# Patient Record
Sex: MALE | Race: OTHER | HISPANIC OR LATINO | Employment: UNEMPLOYED | URBAN - METROPOLITAN AREA
[De-identification: names, ages, dates, MRNs, and addresses within clinical notes are randomized per-mention and may not be internally consistent; named-entity substitution may affect disease eponyms.]

---

## 2020-08-02 ENCOUNTER — ANESTHESIA EVENT (OUTPATIENT)
Dept: SURGERY | Facility: HOSPITAL | Age: 35
DRG: 352 | End: 2020-08-02

## 2020-08-02 ENCOUNTER — HOSPITAL ENCOUNTER (INPATIENT)
Facility: HOSPITAL | Age: 35
LOS: 2 days | Discharge: HOME | DRG: 352 | End: 2020-08-04
Attending: EMERGENCY MEDICINE | Admitting: SURGERY

## 2020-08-02 DIAGNOSIS — R11.2 INTRACTABLE VOMITING WITH NAUSEA, UNSPECIFIED VOMITING TYPE: ICD-10-CM

## 2020-08-02 DIAGNOSIS — K40.30 INCARCERATED INGUINAL HERNIA: Primary | ICD-10-CM

## 2020-08-02 DIAGNOSIS — R10.13 ABDOMINAL PAIN, EPIGASTRIC: ICD-10-CM

## 2020-08-02 DIAGNOSIS — K56.690 OTHER PARTIAL INTESTINAL OBSTRUCTION: ICD-10-CM

## 2020-08-02 LAB
ALBUMIN SERPL BCP-MCNC: 4.6 G/DL (ref 3.5–5.2)
ALP SERPL-CCNC: 106 U/L (ref 55–135)
ALT SERPL W/O P-5'-P-CCNC: 29 U/L (ref 10–44)
ANION GAP SERPL CALC-SCNC: 11 MMOL/L (ref 8–16)
AST SERPL-CCNC: 18 U/L (ref 10–40)
BILIRUB SERPL-MCNC: 1 MG/DL (ref 0.1–1)
BUN SERPL-MCNC: 12 MG/DL (ref 6–20)
CALCIUM SERPL-MCNC: 10.5 MG/DL (ref 8.7–10.5)
CHLORIDE SERPL-SCNC: 97 MMOL/L (ref 95–110)
CO2 SERPL-SCNC: 27 MMOL/L (ref 23–29)
CREAT SERPL-MCNC: 0.8 MG/DL (ref 0.5–1.4)
ERYTHROCYTE [DISTWIDTH] IN BLOOD BY AUTOMATED COUNT: 11.4 % (ref 11.5–14.5)
EST. GFR  (AFRICAN AMERICAN): >60 ML/MIN/1.73 M^2
EST. GFR  (NON AFRICAN AMERICAN): >60 ML/MIN/1.73 M^2
GLUCOSE SERPL-MCNC: 280 MG/DL (ref 70–110)
HCT VFR BLD AUTO: 45.3 % (ref 40–54)
HGB BLD-MCNC: 15.7 G/DL (ref 14–18)
LACTATE SERPL-SCNC: 2.5 MMOL/L (ref 0.5–2.2)
LIPASE SERPL-CCNC: 27 U/L (ref 4–60)
MCH RBC QN AUTO: 32.6 PG (ref 27–31)
MCHC RBC AUTO-ENTMCNC: 34.7 G/DL (ref 32–36)
MCV RBC AUTO: 94 FL (ref 82–98)
PLATELET # BLD AUTO: 141 K/UL (ref 150–350)
PMV BLD AUTO: 11.1 FL (ref 9.2–12.9)
POTASSIUM SERPL-SCNC: 4 MMOL/L (ref 3.5–5.1)
PROT SERPL-MCNC: 7.9 G/DL (ref 6–8.4)
RBC # BLD AUTO: 4.82 M/UL (ref 4.6–6.2)
SARS-COV-2 RDRP RESP QL NAA+PROBE: NEGATIVE
SODIUM SERPL-SCNC: 135 MMOL/L (ref 136–145)
WBC # BLD AUTO: 7.13 K/UL (ref 3.9–12.7)

## 2020-08-02 PROCEDURE — 63600175 PHARM REV CODE 636 W HCPCS: Performed by: SURGERY

## 2020-08-02 PROCEDURE — 63600175 PHARM REV CODE 636 W HCPCS: Performed by: EMERGENCY MEDICINE

## 2020-08-02 PROCEDURE — 96376 TX/PRO/DX INJ SAME DRUG ADON: CPT

## 2020-08-02 PROCEDURE — 85027 COMPLETE CBC AUTOMATED: CPT

## 2020-08-02 PROCEDURE — 83690 ASSAY OF LIPASE: CPT

## 2020-08-02 PROCEDURE — 96375 TX/PRO/DX INJ NEW DRUG ADDON: CPT

## 2020-08-02 PROCEDURE — 99255 IP/OBS CONSLTJ NEW/EST HI 80: CPT | Mod: ,,, | Performed by: SURGERY

## 2020-08-02 PROCEDURE — 99255 PR INITIAL INPATIENT CONSULT,LEVL V: ICD-10-PCS | Mod: ,,, | Performed by: SURGERY

## 2020-08-02 PROCEDURE — 96361 HYDRATE IV INFUSION ADD-ON: CPT

## 2020-08-02 PROCEDURE — 96374 THER/PROPH/DIAG INJ IV PUSH: CPT

## 2020-08-02 PROCEDURE — 94761 N-INVAS EAR/PLS OXIMETRY MLT: CPT

## 2020-08-02 PROCEDURE — 80053 COMPREHEN METABOLIC PANEL: CPT

## 2020-08-02 PROCEDURE — 83605 ASSAY OF LACTIC ACID: CPT

## 2020-08-02 PROCEDURE — U0002 COVID-19 LAB TEST NON-CDC: HCPCS

## 2020-08-02 PROCEDURE — 11000001 HC ACUTE MED/SURG PRIVATE ROOM

## 2020-08-02 PROCEDURE — S0030 INJECTION, METRONIDAZOLE: HCPCS | Performed by: SURGERY

## 2020-08-02 PROCEDURE — 43752 NASAL/OROGASTRIC W/TUBE PLMT: CPT

## 2020-08-02 PROCEDURE — 25000003 PHARM REV CODE 250: Performed by: SURGERY

## 2020-08-02 PROCEDURE — 25500020 PHARM REV CODE 255: Performed by: EMERGENCY MEDICINE

## 2020-08-02 PROCEDURE — 99285 EMERGENCY DEPT VISIT HI MDM: CPT | Mod: 25

## 2020-08-02 PROCEDURE — 25000003 PHARM REV CODE 250: Performed by: EMERGENCY MEDICINE

## 2020-08-02 RX ORDER — ONDANSETRON 2 MG/ML
4 INJECTION INTRAMUSCULAR; INTRAVENOUS EVERY 6 HOURS PRN
Status: DISCONTINUED | OUTPATIENT
Start: 2020-08-02 | End: 2020-08-04 | Stop reason: HOSPADM

## 2020-08-02 RX ORDER — MORPHINE SULFATE 4 MG/ML
4 INJECTION, SOLUTION INTRAMUSCULAR; INTRAVENOUS EVERY 4 HOURS PRN
Status: DISCONTINUED | OUTPATIENT
Start: 2020-08-02 | End: 2020-08-04 | Stop reason: HOSPADM

## 2020-08-02 RX ORDER — METRONIDAZOLE 500 MG/100ML
500 INJECTION, SOLUTION INTRAVENOUS
Status: DISCONTINUED | OUTPATIENT
Start: 2020-08-02 | End: 2020-08-04 | Stop reason: HOSPADM

## 2020-08-02 RX ORDER — ONDANSETRON 2 MG/ML
4 INJECTION INTRAMUSCULAR; INTRAVENOUS
Status: COMPLETED | OUTPATIENT
Start: 2020-08-02 | End: 2020-08-02

## 2020-08-02 RX ORDER — CIPROFLOXACIN 2 MG/ML
400 INJECTION, SOLUTION INTRAVENOUS
Status: DISCONTINUED | OUTPATIENT
Start: 2020-08-02 | End: 2020-08-04 | Stop reason: HOSPADM

## 2020-08-02 RX ORDER — SODIUM CHLORIDE 9 MG/ML
1000 INJECTION, SOLUTION INTRAVENOUS
Status: COMPLETED | OUTPATIENT
Start: 2020-08-02 | End: 2020-08-02

## 2020-08-02 RX ORDER — MORPHINE SULFATE 4 MG/ML
4 INJECTION, SOLUTION INTRAMUSCULAR; INTRAVENOUS
Status: COMPLETED | OUTPATIENT
Start: 2020-08-02 | End: 2020-08-02

## 2020-08-02 RX ORDER — SODIUM CHLORIDE, SODIUM LACTATE, POTASSIUM CHLORIDE, CALCIUM CHLORIDE 600; 310; 30; 20 MG/100ML; MG/100ML; MG/100ML; MG/100ML
INJECTION, SOLUTION INTRAVENOUS CONTINUOUS
Status: DISCONTINUED | OUTPATIENT
Start: 2020-08-02 | End: 2020-08-03

## 2020-08-02 RX ADMIN — METRONIDAZOLE 500 MG: 500 INJECTION, SOLUTION INTRAVENOUS at 08:08

## 2020-08-02 RX ADMIN — ONDANSETRON 4 MG: 2 INJECTION INTRAMUSCULAR; INTRAVENOUS at 07:08

## 2020-08-02 RX ADMIN — MORPHINE SULFATE 4 MG: 4 INJECTION INTRAVENOUS at 01:08

## 2020-08-02 RX ADMIN — ONDANSETRON 4 MG: 2 INJECTION INTRAMUSCULAR; INTRAVENOUS at 01:08

## 2020-08-02 RX ADMIN — SODIUM CHLORIDE 1000 ML: 0.9 INJECTION, SOLUTION INTRAVENOUS at 05:08

## 2020-08-02 RX ADMIN — IOHEXOL 75 ML: 350 INJECTION, SOLUTION INTRAVENOUS at 05:08

## 2020-08-02 RX ADMIN — SODIUM CHLORIDE, SODIUM LACTATE, POTASSIUM CHLORIDE, AND CALCIUM CHLORIDE: .6; .31; .03; .02 INJECTION, SOLUTION INTRAVENOUS at 08:08

## 2020-08-02 RX ADMIN — SODIUM CHLORIDE 1000 ML: 0.9 INJECTION, SOLUTION INTRAVENOUS at 04:08

## 2020-08-02 RX ADMIN — MORPHINE SULFATE 4 MG: 4 INJECTION INTRAVENOUS at 08:08

## 2020-08-02 RX ADMIN — MORPHINE SULFATE 4 MG: 4 INJECTION INTRAVENOUS at 07:08

## 2020-08-02 RX ADMIN — MORPHINE SULFATE 4 MG: 4 INJECTION INTRAVENOUS at 05:08

## 2020-08-02 RX ADMIN — METRONIDAZOLE 500 MG: 500 INJECTION, SOLUTION INTRAVENOUS at 11:08

## 2020-08-02 RX ADMIN — CIPROFLOXACIN 400 MG: 2 INJECTION, SOLUTION INTRAVENOUS at 11:08

## 2020-08-02 RX ADMIN — MORPHINE SULFATE 4 MG: 4 INJECTION INTRAVENOUS at 04:08

## 2020-08-02 RX ADMIN — ONDANSETRON HYDROCHLORIDE 4 MG: 2 SOLUTION INTRAMUSCULAR; INTRAVENOUS at 04:08

## 2020-08-02 RX ADMIN — SODIUM CHLORIDE, SODIUM LACTATE, POTASSIUM CHLORIDE, AND CALCIUM CHLORIDE: .6; .31; .03; .02 INJECTION, SOLUTION INTRAVENOUS at 11:08

## 2020-08-02 NOTE — PLAN OF CARE
Patient came into unit from ER accompanied by transport aide. With intact NGT on left nostril connected to low intermittent wall suction. Placed comfortably in bed. IV fluid and IV antibiotics started. C/o inguinal pain, PRN pain medicine given. Patient kept NPO. Mouth swab and throat given at bedside. Call light within reach. Will continue to monitor.

## 2020-08-02 NOTE — ED NOTES
Pt report received from HA Wiley. Pt is sitting awake, alert, orientedx4 with NG tube in right nostril. NG tube connected to low intermittent suction. Light brown secretions is noted to be suctioned. Pt reports improvement in generalized abd pain since NG tube insertion but states inguinal hernia pain continues. Obvious inguinal bulge noted. Pt reports last BM was yesterday at 1400. VSS. Per Dr. Andrea, place ice pack on inguinal hernia and place patient in trendelenburg position.

## 2020-08-02 NOTE — ED NOTES
Pt tolerated NG tube insertion. Brown secretions noted to be suctioned. Pt placed on low intermittent suction. Will continue to monitor.

## 2020-08-02 NOTE — ED PROVIDER NOTES
Encounter Date: 8/2/2020    SCRIBE #1 NOTE: I, Barb Chiuz, am scribing for, and in the presence of,  Noah Martinez Jr, MD. I have scribed the entire note.       History     Chief Complaint   Patient presents with    Abdominal Pain     Pt presents with c/o generalized abdominal pain that began yesterday, states his stomach feels like its going to explode. Also reports left sided inguinal hernia and vomiting      Navi Álvarez is a 34 y.o. male who  has no past medical history on file.    The patient presents to the ED due to generalized abdominal pain associated with N/V that began yesterday at 21:00.   He reports that he has had R-sided inguinal hernia for 1.5 years, but states it became swollen, hard, and painful yesterday.  He denies any fever, diarrhea, prior abdominal surgery, or any other complaints.     The history is provided by the patient. The history is limited by a language barrier. A  was used (Lola Mendiola RN).     Review of patient's allergies indicates:  No Known Allergies  No past medical history on file.  No past surgical history on file.  No family history on file.  Social History     Tobacco Use    Smoking status: Unknown If Ever Smoked    Smokeless tobacco: Current User     Types: Chew   Substance Use Topics    Alcohol use: Yes     Alcohol/week: 4.0 - 6.0 standard drinks     Types: 4 - 6 Cans of beer per week     Comment: SOMETIMES     Drug use: Not on file     Review of Systems   Constitutional: Negative for chills and fever.   HENT: Negative for sore throat.    Respiratory: Negative for cough and shortness of breath.    Cardiovascular: Negative for chest pain.   Gastrointestinal: Positive for abdominal pain, nausea and vomiting.   Genitourinary: Negative for dysuria, frequency and urgency.   Musculoskeletal: Negative for back pain.   Skin: Negative for rash and wound.   Neurological: Negative for syncope and weakness.   Hematological: Does not  bruise/bleed easily.   Psychiatric/Behavioral: Negative for agitation, behavioral problems and confusion.       Physical Exam     Initial Vitals [08/02/20 0336]   BP Pulse Resp Temp SpO2   128/80 95 16 98.9 °F (37.2 °C) 98 %      MAP       --         Physical Exam    Nursing note and vitals reviewed.  Constitutional: He appears well-developed and well-nourished. He is not diaphoretic. No distress.   HENT:   Head: Normocephalic and atraumatic.   Mouth/Throat: Oropharynx is clear and moist.   Eyes: EOM are normal. Pupils are equal, round, and reactive to light.   Neck: No tracheal deviation present.   Cardiovascular: Normal rate, regular rhythm, normal heart sounds and intact distal pulses.   Pulmonary/Chest: Breath sounds normal. No stridor. No respiratory distress.   Abdominal: Soft. He exhibits no distension and no mass. There is generalized abdominal tenderness. There is no rebound and no guarding. A hernia is present. Hernia confirmed positive in the right inguinal area.   Large R inguinal hernia that is firm and exquisitely tender.  Generalized abdominal tenderness without rigidity, rebound/guarding.   Musculoskeletal: Normal range of motion. No edema.   Neurological: He is alert and oriented to person, place, and time. No cranial nerve deficit or sensory deficit.   Skin: Skin is warm and dry. Capillary refill takes less than 2 seconds. No rash noted.   Psychiatric: He has a normal mood and affect. His behavior is normal. Thought content normal.         ED Course   Procedures  Labs Reviewed   CBC WITHOUT DIFFERENTIAL - Abnormal; Notable for the following components:       Result Value    Mean Corpuscular Hemoglobin 32.6 (*)     RDW 11.4 (*)     Platelets 141 (*)     All other components within normal limits   COMPREHENSIVE METABOLIC PANEL - Abnormal; Notable for the following components:    Sodium 135 (*)     Glucose 280 (*)     All other components within normal limits   LACTIC ACID, PLASMA - Abnormal; Notable  for the following components:    Lactate (Lactic Acid) 2.5 (*)     All other components within normal limits   LIPASE   SARS-COV-2 RNA AMPLIFICATION, QUAL            X-Rays:   Independently Interpreted Readings:   Other Readings:  Reviewed by myself, read by radiology.     Imaging Results          X-ray Abdomen for NG Tube Placement (Nursing should notify Radiology after placement) (Final result)  Result time 08/02/20 09:08:11    Final result by Gibran Harmon MD (08/02/20 09:08:11)                 Impression:      As above.      Electronically signed by: Gibran Harmon  Date:    08/02/2020  Time:    09:08             Narrative:    EXAMINATION:  XR NON-RADIOLOGIST PERFORMED NG/GASTRIC TUBE CHECK    CLINICAL HISTORY:  NGT placement;    TECHNIQUE:  Single AP view of the abdomen.    COMPARISON:  CT abdomen pelvis dated 08/02/2020 and abdominal radiograph dated 08/02/2020    FINDINGS:  Distal aspect of the esophagogastric tube projects over the left upper abdomen.  Dilated small bowel loops again demonstrated.  No evident pneumatosis.  Contrast in the bladder.                               X-Ray Abdomen Flat And Erect (Final result)  Result time 08/02/20 06:17:28    Final result by Kieran Azevedo MD (08/02/20 06:17:28)                 Impression:      Multiple small bowel air-fluid levels concerning for small bowel obstruction.      Electronically signed by: Kieran Azevedo MD  Date:    08/02/2020  Time:    06:17             Narrative:    EXAMINATION:  XR ABDOMEN FLAT AND ERECT    CLINICAL HISTORY:  Epigastric pain    TECHNIQUE:  Flat and erect AP views of the abdomen were performed.    COMPARISON:  None    FINDINGS:  There are multiple small bowel air-fluid levels concerning for small bowel obstruction as seen on corresponding abdominal CT examination.  No evidence of free intraperitoneal air.  Visualized lung bases are unremarkable.  Osseous structures appear intact.                                CT  Abdomen Pelvis With Contrast (Final result)  Result time 08/02/20 05:47:36    Final result by Kieran Azevedo MD (08/02/20 05:47:36)                 Impression:      1.  Findings compatible with small-bowel obstruction with transition point at the level of a small bowel containing right inguinal hernia.  Clinical correlation for incarceration is advised.  Surgical consultation is advised.  No free intraperitoneal air or portal venous gas.    2.  Incidentally noted 4 mm right lower lobe pulmonary micronodule.  For a solid nodule <6 mm, Fleischner Society 2017 guidelines recommend no routine follow up for a low risk patient, or follow-up with non-contrast chest CT at 12 months in a high risk patient.    This report was flagged in Epic as abnormal.      Electronically signed by: Kieran Azevedo MD  Date:    08/02/2020  Time:    05:47             Narrative:    EXAMINATION:  CT ABDOMEN PELVIS WITH CONTRAST    CLINICAL HISTORY:  Abdominal distension;Hernia, complicated;Nausea/vomiting;    TECHNIQUE:  Low dose axial images, sagittal and coronal reformations were obtained from the lung bases to the pubic symphysis following the IV administration of 75 mL of Omnipaque 350 .  Oral contrast was not given.    COMPARISON:  Abdominal radiograph series 08/02/2020    FINDINGS:  There is a 0.4 cm right lower lobe pulmonary micronodule.  There is bibasilar atelectasis.  No significant pleural fluid.  The visualized portions of the heart appear normal.    The liver is normal in size and attenuation with no focal hepatic abnormality.  The gallbladder shows no evidence of stones or pericholecystic fluid.  There is no intra-or extrahepatic biliary ductal dilatation.    The stomach, spleen, pancreas, and adrenal glands are unremarkable.    The kidneys are normal in size and location and enhance symmetrically.  There is no evidence of hydronephrosis. The urinary bladder is unremarkable. The prostate demonstrates no significant  abnormality.    The abdominal aorta is normal in course and caliber without significant atherosclerotic calcifications.    There are fluid-filled dilated loops of small bowel with multiple air-fluid levels identified throughout the abdomen and pelvis.  There is apparent focal transition point at the level of a small bowel containing right inguinal hernia.  There are decompressed loops of more distal ileum.  There is no free intraperitoneal air or portal venous gas.  The colon appears within normal limits.  There is no evidence of acute appendicitis.    Osseous structures demonstrate no acute abnormality.  Mild chronic appearing deformity is noted of the right inferior pubic ramus.  The extraperitoneal soft tissues are unremarkable.                              Medical Decision Making:   History:   Old Medical Records: I decided to obtain old medical records.  Old Records Summarized: records from clinic visits.  Differential Diagnosis:   Differential Diagnosis includes, but is not limited to:  AAA, aortic dissection, mesenteric ischemia, perforated viscous, MI/ACS, SBO/volvulus, incarcerated/strangulated hernia, intussusception, ileus, appendicitis, cholecystitis, cholangitis, diverticulitis, esophagitis, hepatitis, nephrolithiasis, pancreatitis, gastroenteritis, colitis, IBD/IBS, biliary colic, GERD, PUD, constipation, UTI/pyelonephritis,  disorder.    Clinical Tests:   Lab Tests: Reviewed and Ordered  Radiological Study: Reviewed and Ordered  ED Management:  X-ray concerning for multiple air-fluid levels. Coupled with physical exam findings, concern for incarcerated inguinal hernia with SBO.  Discussed with Dr. Andrea, General Surgery, who will evaluate and bedside and plan for admission and eventual surgical repair.    CT A/P confirmed SBO from bowel-containing R inguinal hernia.  NGT ordered and placed. Surgery to admit.    On re-evaluation, the patient's status has remained stable.  At this time, I believe the  patient should be admitted to the hospital for further evaluation and management of incarcerated hernia, SBO.  General Surgery service was contacted and the case was discussed.   The consulting physician/team agrees with plan and will admit under their service.   The patient and family were updated with test results, overall impression, and further plan of care. All questions were answered. The patient expressed understanding and agrees with the current plan.                     ED Course as of Aug 02 1858   Sun Aug 02, 2020   0544 34-year-old male with no significant past medical history presents to ER due to diffuse abdominal pain associated with nausea and vomiting.  Also endorses a right inguinal hernia that has been present for the last year.  On arrival, patient actively vomiting.  Exam with firm, swollen, exquisitely tender right inguinal hernia.  Mild generalized abdominal tenderness without peritoneal signs.  Will obtain labs, flat and erect abdomen x-ray to rule out perforation.  Will obtain CT for further characterization and anticipate discussion with General surgery for further management of incarcerated hernia.    [SS]      ED Course User Index  [SS] Naoh Martinez MD                Clinical Impression:       ICD-10-CM ICD-9-CM   1. Incarcerated inguinal hernia  K40.30 550.10   2. Abdominal pain, epigastric  R10.13 789.06   3. Other partial intestinal obstruction  K56.690 560.89   4. Intractable vomiting with nausea, unspecified vomiting type  R11.2 536.2             ED Disposition Condition    Admit                      I, Dr. Noah Martinez, personally performed the services described in this documentation. All medical record entries made by the scribe were at my direction and in my presence.  I have reviewed the chart and agree that the record reflects my personal performance and is accurate and complete.     Noah Martinez MD.               Noah Martinez MD  08/02/20 2930       Noah Martinez,  MD  08/10/20 0918

## 2020-08-02 NOTE — H&P
[facility]  INPATIENT H&P    REASON FOR CONSULT/ADMISSION:  Incarcerated right inguinal hernia    HPI: Navi Álvarez is a 34 y.o. male with known bilateral inguinal hernias.  Patient had acute onset of right groin pain and mass yesterday evening.  He notes associated nausea and vomiting times multiple episodes.  The patient has otherwise been in his baseline state of health.  He denies fevers or chills.  He states he did have a bowel movement at some point this morning.  He presented to the [place] ER where CT scan was performed that showed incarcerated right inguinal hernia as well as a left inguinal hernia.  There is no radiographic evidence to support strangulation of the right side.  Patient does not speak English in the history and physical were completed using aid of an .  White blood count is normal his lactic acid is slightly elevated.  He is nontoxic.    I have reviewed the patient's chart including prior progress notes, procedures and testing.     ROS:   Review of Systems   All other systems reviewed and are negative.      PROBLEM LIST:  There is no problem list on file for this patient.        HISTORY  No past medical history on file.    No past surgical history on file.    Social History     Tobacco Use    Smoking status: Not on file   Substance Use Topics    Alcohol use: Not on file    Drug use: Not on file       No family history on file.      MEDS:  No current facility-administered medications on file prior to encounter.      No current outpatient medications on file prior to encounter.       ALLERGIES:  Review of patient's allergies indicates:  No Known Allergies      VITALS:  Temp:  [98.9 °F (37.2 °C)] 98.9 °F (37.2 °C)  Pulse:  [] 86  Resp:  [16-20] 20  SpO2:  [95 %-99 %] 97 %  BP: (117-150)/(59-89) 126/79    I/O last 3 completed shifts:  In: 2000 [I.V.:2000]  Out: -       PHYSICAL EXAM:  Physical Exam  Constitutional:       General: He is not in acute  distress.     Appearance: He is well-developed.   HENT:      Head: Normocephalic and atraumatic.      Comments: Multiple facial scars  Eyes:      Conjunctiva/sclera: Conjunctivae normal.      Pupils: Pupils are equal, round, and reactive to light.   Neck:      Musculoskeletal: Neck supple.   Cardiovascular:      Rate and Rhythm: Normal rate and regular rhythm.   Pulmonary:      Effort: Pulmonary effort is normal.   Abdominal:      General: There is no distension.      Palpations: Abdomen is soft. There is no mass.      Tenderness: There is no abdominal tenderness. There is no guarding or rebound.      Hernia: No hernia is present.   Genitourinary:     Comments: Bilateral incarcerated inguinal hernias right greater than left.  No tenderness to palpation on the left side.  Right side is tender but there is no overlying skin erythema or crepitance.  Musculoskeletal: Normal range of motion.   Skin:     General: Skin is warm and dry.      Findings: No rash.   Neurological:      Mental Status: He is alert and oriented to person, place, and time.   Psychiatric:         Behavior: Behavior normal.           LABS:  Lab Results   Component Value Date    WBC 7.13 08/02/2020    RBC 4.82 08/02/2020    HGB 15.7 08/02/2020    HCT 45.3 08/02/2020     (L) 08/02/2020     Lab Results   Component Value Date     (H) 08/02/2020     (L) 08/02/2020    K 4.0 08/02/2020    CL 97 08/02/2020    CO2 27 08/02/2020    BUN 12 08/02/2020    CREATININE 0.8 08/02/2020    CALCIUM 10.5 08/02/2020     Lab Results   Component Value Date    ALT 29 08/02/2020    AST 18 08/02/2020    ALKPHOS 106 08/02/2020    BILITOT 1.0 08/02/2020     No results found for: MG, PHOS    STUDIES:  CT scan the abdomen pelvis as well as abdominal x-ray images and reports were personally reviewed.          ASSESSMENT & PLAN:  34 y.o. male with acutely incarcerated right inguinal hernia without strangulation as well as a chronic left inguinal hernia.  I will  admit the patient a plan for robotic bilateral inguinal hernia pair tomorrow S robot is not available over the weekend.  The patient should develop signs of strangulation, we will proceed with urgent open repair.      Pancho Andrea M.D., F.A.C.S.  [id number] and General Surgery  [facility]

## 2020-08-02 NOTE — ED NOTES
"Ice pack placed in inguinal hernia and patient placed in trendelenburg position. Pt could not tolerate trendelenburg position and started spitting up. Pt states "tube is hurting when you lay my flat." pt placed in upright position. MD informed. Will continue to monitor.   "

## 2020-08-03 ENCOUNTER — ANESTHESIA (OUTPATIENT)
Dept: SURGERY | Facility: HOSPITAL | Age: 35
DRG: 352 | End: 2020-08-03

## 2020-08-03 LAB
ANION GAP SERPL CALC-SCNC: 3 MMOL/L (ref 8–16)
BASOPHILS # BLD AUTO: 0.02 K/UL (ref 0–0.2)
BASOPHILS NFR BLD: 0.3 % (ref 0–1.9)
BUN SERPL-MCNC: 8 MG/DL (ref 6–20)
CALCIUM SERPL-MCNC: 8.4 MG/DL (ref 8.7–10.5)
CHLORIDE SERPL-SCNC: 103 MMOL/L (ref 95–110)
CO2 SERPL-SCNC: 30 MMOL/L (ref 23–29)
CREAT SERPL-MCNC: 0.7 MG/DL (ref 0.5–1.4)
DIFFERENTIAL METHOD: ABNORMAL
EOSINOPHIL # BLD AUTO: 0.1 K/UL (ref 0–0.5)
EOSINOPHIL NFR BLD: 1.2 % (ref 0–8)
ERYTHROCYTE [DISTWIDTH] IN BLOOD BY AUTOMATED COUNT: 11.8 % (ref 11.5–14.5)
EST. GFR  (AFRICAN AMERICAN): >60 ML/MIN/1.73 M^2
EST. GFR  (NON AFRICAN AMERICAN): >60 ML/MIN/1.73 M^2
GLUCOSE SERPL-MCNC: 158 MG/DL (ref 70–110)
HCT VFR BLD AUTO: 41.4 % (ref 40–54)
HGB BLD-MCNC: 14 G/DL (ref 14–18)
IMM GRANULOCYTES # BLD AUTO: 0.01 K/UL (ref 0–0.04)
IMM GRANULOCYTES NFR BLD AUTO: 0.2 % (ref 0–0.5)
LYMPHOCYTES # BLD AUTO: 0.9 K/UL (ref 1–4.8)
LYMPHOCYTES NFR BLD: 16 % (ref 18–48)
MCH RBC QN AUTO: 33 PG (ref 27–31)
MCHC RBC AUTO-ENTMCNC: 33.8 G/DL (ref 32–36)
MCV RBC AUTO: 98 FL (ref 82–98)
MONOCYTES # BLD AUTO: 0.3 K/UL (ref 0.3–1)
MONOCYTES NFR BLD: 5.8 % (ref 4–15)
NEUTROPHILS # BLD AUTO: 4.5 K/UL (ref 1.8–7.7)
NEUTROPHILS NFR BLD: 76.5 % (ref 38–73)
NRBC BLD-RTO: 0 /100 WBC
PLATELET # BLD AUTO: 146 K/UL (ref 150–350)
PMV BLD AUTO: 10.2 FL (ref 9.2–12.9)
POTASSIUM SERPL-SCNC: 3.7 MMOL/L (ref 3.5–5.1)
RBC # BLD AUTO: 4.24 M/UL (ref 4.6–6.2)
SODIUM SERPL-SCNC: 136 MMOL/L (ref 136–145)
WBC # BLD AUTO: 5.83 K/UL (ref 3.9–12.7)

## 2020-08-03 PROCEDURE — C1781 MESH (IMPLANTABLE): HCPCS | Performed by: SURGERY

## 2020-08-03 PROCEDURE — 25000003 PHARM REV CODE 250: Performed by: STUDENT IN AN ORGANIZED HEALTH CARE EDUCATION/TRAINING PROGRAM

## 2020-08-03 PROCEDURE — 36415 COLL VENOUS BLD VENIPUNCTURE: CPT

## 2020-08-03 PROCEDURE — 11000001 HC ACUTE MED/SURG PRIVATE ROOM

## 2020-08-03 PROCEDURE — 36000710: Performed by: SURGERY

## 2020-08-03 PROCEDURE — 63600175 PHARM REV CODE 636 W HCPCS: Performed by: NURSE ANESTHETIST, CERTIFIED REGISTERED

## 2020-08-03 PROCEDURE — 63600175 PHARM REV CODE 636 W HCPCS: Performed by: SURGERY

## 2020-08-03 PROCEDURE — 71000033 HC RECOVERY, INTIAL HOUR: Performed by: SURGERY

## 2020-08-03 PROCEDURE — 80048 BASIC METABOLIC PNL TOTAL CA: CPT

## 2020-08-03 PROCEDURE — 25000003 PHARM REV CODE 250: Performed by: SURGERY

## 2020-08-03 PROCEDURE — 63600175 PHARM REV CODE 636 W HCPCS: Performed by: ANESTHESIOLOGY

## 2020-08-03 PROCEDURE — 49650 PR LAP,INGUINAL HERNIA REPR,INITIAL: ICD-10-PCS | Mod: 50,,, | Performed by: SURGERY

## 2020-08-03 PROCEDURE — 88302 TISSUE EXAM BY PATHOLOGIST: CPT | Mod: 26,,, | Performed by: PATHOLOGY

## 2020-08-03 PROCEDURE — 27201423 OPTIME MED/SURG SUP & DEVICES STERILE SUPPLY: Performed by: SURGERY

## 2020-08-03 PROCEDURE — 25000003 PHARM REV CODE 250: Performed by: NURSE ANESTHETIST, CERTIFIED REGISTERED

## 2020-08-03 PROCEDURE — 37000009 HC ANESTHESIA EA ADD 15 MINS: Performed by: SURGERY

## 2020-08-03 PROCEDURE — S0030 INJECTION, METRONIDAZOLE: HCPCS | Performed by: SURGERY

## 2020-08-03 PROCEDURE — 94761 N-INVAS EAR/PLS OXIMETRY MLT: CPT

## 2020-08-03 PROCEDURE — S0030 INJECTION, METRONIDAZOLE: HCPCS | Performed by: STUDENT IN AN ORGANIZED HEALTH CARE EDUCATION/TRAINING PROGRAM

## 2020-08-03 PROCEDURE — 37000008 HC ANESTHESIA 1ST 15 MINUTES: Performed by: SURGERY

## 2020-08-03 PROCEDURE — 36000711: Performed by: SURGERY

## 2020-08-03 PROCEDURE — 85025 COMPLETE CBC W/AUTO DIFF WBC: CPT

## 2020-08-03 PROCEDURE — 49650 LAP ING HERNIA REPAIR INIT: CPT | Mod: 50,,, | Performed by: SURGERY

## 2020-08-03 PROCEDURE — 71000039 HC RECOVERY, EACH ADD'L HOUR: Performed by: SURGERY

## 2020-08-03 PROCEDURE — 88302 PR  SURG PATH,LEVEL II: ICD-10-PCS | Mod: 26,,, | Performed by: PATHOLOGY

## 2020-08-03 PROCEDURE — 88302 TISSUE EXAM BY PATHOLOGIST: CPT | Performed by: PATHOLOGY

## 2020-08-03 DEVICE — MESH PROGRIP LAP 10X15CM LEFT: Type: IMPLANTABLE DEVICE | Site: INGUINAL | Status: FUNCTIONAL

## 2020-08-03 DEVICE — MESH PROGRIP LAP 10X15CM RIGHT: Type: IMPLANTABLE DEVICE | Site: INGUINAL | Status: FUNCTIONAL

## 2020-08-03 RX ORDER — PROPOFOL 10 MG/ML
VIAL (ML) INTRAVENOUS
Status: DISCONTINUED | OUTPATIENT
Start: 2020-08-03 | End: 2020-08-03

## 2020-08-03 RX ORDER — OXYCODONE HYDROCHLORIDE 5 MG/1
5 TABLET ORAL EVERY 4 HOURS PRN
Status: DISCONTINUED | OUTPATIENT
Start: 2020-08-03 | End: 2020-08-04 | Stop reason: HOSPADM

## 2020-08-03 RX ORDER — MIDAZOLAM HYDROCHLORIDE 1 MG/ML
INJECTION, SOLUTION INTRAMUSCULAR; INTRAVENOUS
Status: DISCONTINUED | OUTPATIENT
Start: 2020-08-03 | End: 2020-08-03

## 2020-08-03 RX ORDER — ROCURONIUM BROMIDE 10 MG/ML
INJECTION, SOLUTION INTRAVENOUS
Status: DISCONTINUED | OUTPATIENT
Start: 2020-08-03 | End: 2020-08-03

## 2020-08-03 RX ORDER — FENTANYL CITRATE 50 UG/ML
INJECTION, SOLUTION INTRAMUSCULAR; INTRAVENOUS
Status: DISCONTINUED | OUTPATIENT
Start: 2020-08-03 | End: 2020-08-03

## 2020-08-03 RX ORDER — SODIUM CHLORIDE 0.9 % (FLUSH) 0.9 %
3 SYRINGE (ML) INJECTION
Status: DISCONTINUED | OUTPATIENT
Start: 2020-08-03 | End: 2020-08-03

## 2020-08-03 RX ORDER — TALC
6 POWDER (GRAM) TOPICAL NIGHTLY PRN
Status: DISCONTINUED | OUTPATIENT
Start: 2020-08-03 | End: 2020-08-04 | Stop reason: HOSPADM

## 2020-08-03 RX ORDER — ACETAMINOPHEN 325 MG/1
650 TABLET ORAL EVERY 8 HOURS PRN
Status: DISCONTINUED | OUTPATIENT
Start: 2020-08-03 | End: 2020-08-04 | Stop reason: HOSPADM

## 2020-08-03 RX ORDER — VECURONIUM BROMIDE FOR INJECTION 1 MG/ML
INJECTION, POWDER, LYOPHILIZED, FOR SOLUTION INTRAVENOUS
Status: DISCONTINUED | OUTPATIENT
Start: 2020-08-03 | End: 2020-08-03

## 2020-08-03 RX ORDER — PHENYLEPHRINE HYDROCHLORIDE 10 MG/ML
INJECTION INTRAVENOUS
Status: DISCONTINUED | OUTPATIENT
Start: 2020-08-03 | End: 2020-08-03

## 2020-08-03 RX ORDER — GLYCOPYRROLATE 0.2 MG/ML
INJECTION INTRAMUSCULAR; INTRAVENOUS
Status: DISCONTINUED | OUTPATIENT
Start: 2020-08-03 | End: 2020-08-03

## 2020-08-03 RX ORDER — SUCCINYLCHOLINE CHLORIDE 20 MG/ML
INJECTION INTRAMUSCULAR; INTRAVENOUS
Status: DISCONTINUED | OUTPATIENT
Start: 2020-08-03 | End: 2020-08-03

## 2020-08-03 RX ORDER — LIDOCAINE HCL/PF 100 MG/5ML
SYRINGE (ML) INTRAVENOUS
Status: DISCONTINUED | OUTPATIENT
Start: 2020-08-03 | End: 2020-08-03

## 2020-08-03 RX ORDER — SODIUM CHLORIDE 0.9 % (FLUSH) 0.9 %
10 SYRINGE (ML) INJECTION
Status: DISCONTINUED | OUTPATIENT
Start: 2020-08-03 | End: 2020-08-04 | Stop reason: HOSPADM

## 2020-08-03 RX ORDER — ACETAMINOPHEN 10 MG/ML
INJECTION, SOLUTION INTRAVENOUS
Status: DISCONTINUED | OUTPATIENT
Start: 2020-08-03 | End: 2020-08-03

## 2020-08-03 RX ORDER — HYDROMORPHONE HYDROCHLORIDE 2 MG/ML
0.5 INJECTION, SOLUTION INTRAMUSCULAR; INTRAVENOUS; SUBCUTANEOUS EVERY 5 MIN PRN
Status: DISCONTINUED | OUTPATIENT
Start: 2020-08-03 | End: 2020-08-03

## 2020-08-03 RX ORDER — NEOSTIGMINE METHYLSULFATE 1 MG/ML
INJECTION, SOLUTION INTRAVENOUS
Status: DISCONTINUED | OUTPATIENT
Start: 2020-08-03 | End: 2020-08-03

## 2020-08-03 RX ORDER — DIPHENHYDRAMINE HYDROCHLORIDE 50 MG/ML
12.5 INJECTION INTRAMUSCULAR; INTRAVENOUS EVERY 6 HOURS PRN
Status: DISCONTINUED | OUTPATIENT
Start: 2020-08-03 | End: 2020-08-03

## 2020-08-03 RX ORDER — SODIUM CHLORIDE, SODIUM LACTATE, POTASSIUM CHLORIDE, CALCIUM CHLORIDE 600; 310; 30; 20 MG/100ML; MG/100ML; MG/100ML; MG/100ML
INJECTION, SOLUTION INTRAVENOUS CONTINUOUS PRN
Status: DISCONTINUED | OUTPATIENT
Start: 2020-08-03 | End: 2020-08-03

## 2020-08-03 RX ORDER — INDOCYANINE GREEN AND WATER 25 MG
KIT INJECTION
Status: DISCONTINUED | OUTPATIENT
Start: 2020-08-03 | End: 2020-08-03

## 2020-08-03 RX ORDER — ONDANSETRON 2 MG/ML
4 INJECTION INTRAMUSCULAR; INTRAVENOUS DAILY PRN
Status: DISCONTINUED | OUTPATIENT
Start: 2020-08-03 | End: 2020-08-03

## 2020-08-03 RX ORDER — KETOROLAC TROMETHAMINE 30 MG/ML
INJECTION, SOLUTION INTRAMUSCULAR; INTRAVENOUS
Status: DISCONTINUED | OUTPATIENT
Start: 2020-08-03 | End: 2020-08-03

## 2020-08-03 RX ORDER — ONDANSETRON 2 MG/ML
INJECTION INTRAMUSCULAR; INTRAVENOUS
Status: DISCONTINUED | OUTPATIENT
Start: 2020-08-03 | End: 2020-08-03

## 2020-08-03 RX ORDER — BUPIVACAINE HYDROCHLORIDE 5 MG/ML
INJECTION, SOLUTION PERINEURAL
Status: DISCONTINUED | OUTPATIENT
Start: 2020-08-03 | End: 2020-08-03 | Stop reason: HOSPADM

## 2020-08-03 RX ORDER — ONDANSETRON 8 MG/1
8 TABLET, ORALLY DISINTEGRATING ORAL EVERY 8 HOURS PRN
Status: DISCONTINUED | OUTPATIENT
Start: 2020-08-03 | End: 2020-08-04 | Stop reason: HOSPADM

## 2020-08-03 RX ORDER — ACETAMINOPHEN 325 MG/1
650 TABLET ORAL EVERY 4 HOURS PRN
Status: DISCONTINUED | OUTPATIENT
Start: 2020-08-03 | End: 2020-08-04 | Stop reason: HOSPADM

## 2020-08-03 RX ORDER — OXYCODONE HYDROCHLORIDE 5 MG/1
10 TABLET ORAL EVERY 4 HOURS PRN
Status: DISCONTINUED | OUTPATIENT
Start: 2020-08-03 | End: 2020-08-04 | Stop reason: HOSPADM

## 2020-08-03 RX ADMIN — INDOCYANINE GREEN 5 MG: KIT INTRAVENOUS at 12:08

## 2020-08-03 RX ADMIN — LIDOCAINE HYDROCHLORIDE 100 MG: 20 INJECTION, SOLUTION INTRAVENOUS at 11:08

## 2020-08-03 RX ADMIN — PHENYLEPHRINE HYDROCHLORIDE 100 MCG: 10 INJECTION INTRAVENOUS at 01:08

## 2020-08-03 RX ADMIN — MIDAZOLAM 2 MG: 1 INJECTION INTRAMUSCULAR; INTRAVENOUS at 11:08

## 2020-08-03 RX ADMIN — CIPROFLOXACIN 400 MG: 2 INJECTION, SOLUTION INTRAVENOUS at 12:08

## 2020-08-03 RX ADMIN — PHENYLEPHRINE HYDROCHLORIDE 100 MCG: 10 INJECTION INTRAVENOUS at 11:08

## 2020-08-03 RX ADMIN — SUCCINYLCHOLINE CHLORIDE 120 MG: 20 INJECTION, SOLUTION INTRAMUSCULAR; INTRAVENOUS at 11:08

## 2020-08-03 RX ADMIN — ONDANSETRON 4 MG: 2 INJECTION INTRAMUSCULAR; INTRAVENOUS at 01:08

## 2020-08-03 RX ADMIN — KETOROLAC TROMETHAMINE 30 MG: 30 INJECTION, SOLUTION INTRAMUSCULAR; INTRAVENOUS at 01:08

## 2020-08-03 RX ADMIN — ACETAMINOPHEN 1000 MG: 10 INJECTION, SOLUTION INTRAVENOUS at 12:08

## 2020-08-03 RX ADMIN — MORPHINE SULFATE 4 MG: 4 INJECTION INTRAVENOUS at 09:08

## 2020-08-03 RX ADMIN — FENTANYL CITRATE 100 MCG: 50 INJECTION, SOLUTION INTRAMUSCULAR; INTRAVENOUS at 12:08

## 2020-08-03 RX ADMIN — NEOSTIGMINE METHYLSULFATE 5 MG: 1 INJECTION INTRAVENOUS at 02:08

## 2020-08-03 RX ADMIN — FENTANYL CITRATE 50 MCG: 50 INJECTION, SOLUTION INTRAMUSCULAR; INTRAVENOUS at 01:08

## 2020-08-03 RX ADMIN — GLYCOPYRROLATE 0.2 MG: 0.2 INJECTION, SOLUTION INTRAMUSCULAR; INTRAVENOUS at 11:08

## 2020-08-03 RX ADMIN — SODIUM CHLORIDE, SODIUM LACTATE, POTASSIUM CHLORIDE, AND CALCIUM CHLORIDE: .6; .31; .03; .02 INJECTION, SOLUTION INTRAVENOUS at 11:08

## 2020-08-03 RX ADMIN — OXYCODONE HYDROCHLORIDE 10 MG: 5 TABLET ORAL at 09:08

## 2020-08-03 RX ADMIN — MORPHINE SULFATE 4 MG: 4 INJECTION INTRAVENOUS at 05:08

## 2020-08-03 RX ADMIN — PHENYLEPHRINE HYDROCHLORIDE 100 MCG: 10 INJECTION INTRAVENOUS at 12:08

## 2020-08-03 RX ADMIN — ROCURONIUM BROMIDE 50 MG: 10 INJECTION, SOLUTION INTRAVENOUS at 11:08

## 2020-08-03 RX ADMIN — GLYCOPYRROLATE 0.7 MG: 0.2 INJECTION, SOLUTION INTRAMUSCULAR; INTRAVENOUS at 02:08

## 2020-08-03 RX ADMIN — METRONIDAZOLE 500 MG: 500 INJECTION, SOLUTION INTRAVENOUS at 11:08

## 2020-08-03 RX ADMIN — HYDROMORPHONE HYDROCHLORIDE 0.5 MG: 2 INJECTION, SOLUTION INTRAMUSCULAR; INTRAVENOUS; SUBCUTANEOUS at 02:08

## 2020-08-03 RX ADMIN — METRONIDAZOLE 500 MG: 500 INJECTION, SOLUTION INTRAVENOUS at 05:08

## 2020-08-03 RX ADMIN — METRONIDAZOLE 500 MG: 500 INJECTION, SOLUTION INTRAVENOUS at 08:08

## 2020-08-03 RX ADMIN — MORPHINE SULFATE 4 MG: 4 INJECTION INTRAVENOUS at 12:08

## 2020-08-03 RX ADMIN — FENTANYL CITRATE 100 MCG: 50 INJECTION, SOLUTION INTRAMUSCULAR; INTRAVENOUS at 11:08

## 2020-08-03 RX ADMIN — VECURONIUM BROMIDE 2 MG: 10 INJECTION, POWDER, LYOPHILIZED, FOR SOLUTION INTRAVENOUS at 01:08

## 2020-08-03 RX ADMIN — ONDANSETRON 8 MG: 2 INJECTION, SOLUTION INTRAMUSCULAR; INTRAVENOUS at 11:08

## 2020-08-03 RX ADMIN — PROPOFOL 150 MG: 10 INJECTION, EMULSION INTRAVENOUS at 11:08

## 2020-08-03 NOTE — TRANSFER OF CARE
"Anesthesia Transfer of Care Note    Patient: Navi Álvarez    Procedure(s) Performed: Procedure(s) (LRB):  ROBOTIC REPAIR, HERNIA, INGUINAL (Bilateral)    Patient location: PACU    Anesthesia Type: general    Transport from OR: Transported from OR on 6-10 L/min O2 by face mask with adequate spontaneous ventilation    Post pain: adequate analgesia    Post assessment: no apparent anesthetic complications and tolerated procedure well    Post vital signs: stable    Level of consciousness: awake (opens eyes to verbal command)    Nausea/Vomiting: no nausea/vomiting    Complications: none    Transfer of care protocol was followed      Last vitals:   Visit Vitals  BP (!) 99/54   Pulse 92   Temp 37 °C (98.6 °F) (Temporal)   Resp 12   Ht 5' 3" (1.6 m)   Wt 61.5 kg (135 lb 9.3 oz)   SpO2 100%   BMI 24.02 kg/m²     "

## 2020-08-03 NOTE — OP NOTE
PATIENT: Navi Álvarez    MRN: [id number]    DATE OF PROCEDURE: 08/03/2020    PREOPERATIVE DIAGNOSIS:  Chronic bilateral inguinal hernia with acute incarceration of the right side     POSTOPERATIVE DIAGNOSIS:  Same as above    PROCEDURE: Robotic Repair of Bilateral Incarcerated Inguinal Hernia with Mesh and excision of bilateral cord lipoma    SURGEON: Pancho Andrea M.D., YVES    ANESTHESIA: General     ESTIMATED BLOOD LOSS: minimal    SPECIMEN:  Bilateral hernia sac and cord lipoma    CONDITION:  Stable    COMPLICATIONS: None    FINDINGS:   Bilateral indirect and direct hernias with acute incarceration of the direct component on each side.  The left side had incarcerated omentum and the right said had incarcerated small bowel which was viable.    INDICATIONS: The patient is a 34-year-old  male with known bilateral inguinal hernias with acute incarceration of the right side.. R/B/A to Robotic Inguinal Hernia surgery were explained to the patient in detail.  The risks include, but are not limited to: bleeding, infection, re-operation, injury to surrounding structures,reaction to anesthesia, kacy-operative cardiopulmonary events including PE/DVT, hernia recurrence, chronic pain, need for mesh removal, testicular injury or loss, and possible death.  The patient stated a clear understanding of the risks and requests the procedure be done.    PROCEDURE IN DETAIL:  After surgical consent was obtained, the patient was transported to the operative theater and onto the operating room table in a supine position.  General endotracheal anesthesia was administered without difficulty.  The patient's abdomen was prepped and draped in a standard sterile fashion.  Perioperative antibiotics were given and a time-out was performed in order to ensure the proper patient and procedure.  An incision was made in the epigastric region and the anterior fascia was elevated.  The fascia was divided without difficulty  and a robotic trocar was inserted without difficulty.  Two additional robotic trocars were inserted under direct visualization and the robot was docked after placing the patient in the Trendelenburg position.  The pelvis was examined and there were direct and indirect hernias on each side.  The left side contained incarcerated omentum in the right side contained incarcerated small bowel.  Each side was reduced with gentle internal and external pressure.  The small bowel right side was bruised but viable.  We injected ICG and confirmed perfusion of this piece of the small bowel using firefly. The peritoneum on the right side was scored and a space was created between it and the underlying fascia.  This was expanded in a medial to lateral fashion.  The epigastric vessels were identified and preserved as well as the cord structures.  The contents of the hernia were completely reduced without difficulty.  A cord lipoma was present on each side was excised.  Once adequate working space was established, a piece of Progrip mesh was inserted and used to cover the entire myopectineal orifice.  It covered the hernia defect by multiple cm in all directions.  The peritoneum was then reapproximated using 3-0 V lock suture.  This technique was repeated on the left side without difficulty.  The robot was undocked and we removed the hernia contents and cord lipomas through a specimen bag.  The remaining trocars were removed and the abdomen was desufflated.  The skin incisions were irrigated out with sterile saline and closed with interrupted Monocryl suture.  The wounds were dressed in a standard sterile fashion.  At the end of the procedure, the instrument, lap, and needle counts were all correct.  The patient was awoken from anesthesia, having tolerated the procedure without difficulty, and was returned to the PACU in a stable condition.  At the end of the procedure the patient's family was updated all questions were  answered.    DISPO:  Returned to the floor stable condition      Pancho Andrea M.D., F.A.C.S.  [id number] and General Surgery  [facility]

## 2020-08-03 NOTE — NURSING TRANSFER
Nursing Transfer Note      8/3/2020     Transfer To: 532    Transfer via stretcher    Transfer with     Transported by pct    Medicines sent: no    Chart send with patient: Yes    Notified: family

## 2020-08-03 NOTE — NURSING
Pt back from pacu. Vital signs taken. Pt moving around per self. 3 abdominal lap sites with dermabond  Clean dry and intact;  plan of care reviewed with   provided per Anel. Pt tolerated a juice. No complaints of pain.  Pt due to void by 10 pm tonight.

## 2020-08-03 NOTE — PLAN OF CARE
Problem: Adult Inpatient Plan of Care  Goal: Plan of Care Review  Outcome: Ongoing, Progressing     VIRTUAL NURSE:  Cued into patient's room.  HA Fallon and AVIVA  ([id number]) at bedside.  Permission received per patient to turn camera to view patient.  Introduced as VN for night shift that will be working with floor nurse and nursing assistant.  Educated patient on VN's role in patient care and  [facility] model.  Plan of care reviewed with patient.  Education per flowsheet.   Call light within reach; bed siderails up x3.  Opportunity given for questions and questions answered.  Completed history and home med list.  C/o abd pain 9/10 but just received morphine; instructed that he needs to wait for it to work.  Verbalized understanding.  Instructed to call for assistance.  Will cont to monitor and intervene as needed.    Labs, notes, orders, and careplan reviewed.

## 2020-08-03 NOTE — ANESTHESIA PREPROCEDURE EVALUATION
08/03/2020  Navi Álvarez is a 34 y.o., male Yi speaking, admitted with incarcerated inguinal hernia for robotic repair under GETA, has NGT with bilious output, RSI.    History reviewed. No pertinent past medical history.  History reviewed. No pertinent surgical history.      Anesthesia Evaluation    I have reviewed the Patient Summary Reports.   I have reviewed the NPO Status.   I have reviewed the Medications.     Review of Systems  Social:  Non-Smoker, Alcohol Use    Cardiovascular:  Cardiovascular Normal Exercise tolerance: good     Pulmonary:  Pulmonary Normal        Physical Exam  General:  Well nourished    Airway/Jaw/Neck:  Airway Findings: Mallampati: II     Dental:  Dental Findings: Periodontal disease, Severe   Chest/Lungs:  Chest/Lungs Clear    Heart/Vascular:  Heart Findings: Normal          Lab Results   Component Value Date    WBC 7.13 08/02/2020    HGB 15.7 08/02/2020    HCT 45.3 08/02/2020     (L) 08/02/2020    ALT 29 08/02/2020    AST 18 08/02/2020     (L) 08/02/2020    K 4.0 08/02/2020    CL 97 08/02/2020    CREATININE 0.8 08/02/2020    BUN 12 08/02/2020    CO2 27 08/02/2020         Anesthesia Plan  Type of Anesthesia, risks & benefits discussed:  Anesthesia Type:  general  Patient's Preference:   Intra-op Monitoring Plan: standard ASA monitors  Intra-op Monitoring Plan Comments:   Post Op Pain Control Plan:   Post Op Pain Control Plan Comments:   Induction:   rapid sequence  Beta Blocker:  Patient is not currently on a Beta-Blocker (No further documentation required).       Informed Consent: Patient understands risks and agrees with Anesthesia plan.  Questions answered. Anesthesia consent signed with patient.  ASA Score: 1  emergent   Day of Surgery Review of History & Physical:            Ready For Surgery From Anesthesia Perspective.

## 2020-08-03 NOTE — BRIEF OP NOTE
[facility]  Brief Operative Note    SUMMARY     Surgery Date: 8/3/2020     Surgeon(s) and Role:     * Pancho Andrea MD - Primary    Assisting Surgeon: None    Pre-op Diagnosis:  Incarcerated inguinal hernia [K40.30]    Post-op Diagnosis:  Post-Op Diagnosis Codes:     * Incarcerated inguinal hernia [K40.30]    Procedure(s) (LRB):  ROBOTIC REPAIR, HERNIA, INGUINAL (Bilateral)    Anesthesia: General/MAC    Description of Procedure: see OP note    Description of the findings of the procedure: Bilateral Indirect and Direct with incarcerated omentum on the left, incarcerated small bowel on the right    Estimated Blood Loss: * No values recorded between 8/3/2020 11:59 AM and 8/3/2020  2:05 PM *         Specimens:   Specimen (12h ago, onward)    None

## 2020-08-03 NOTE — ANESTHESIA POSTPROCEDURE EVALUATION
Anesthesia Post Evaluation    Patient: Navi Álvarez    Procedure(s) Performed: Procedure(s) (LRB):  ROBOTIC REPAIR, HERNIA, INGUINAL (Bilateral)    Final Anesthesia Type: general    Patient location during evaluation: PACU  Patient participation: Yes- Able to Participate  Level of consciousness: awake and alert, oriented and awake  Post-procedure vital signs: reviewed and stable  Pain management: adequate  Airway patency: patent    PONV status at discharge: No PONV  Anesthetic complications: no      Cardiovascular status: blood pressure returned to baseline  Respiratory status: unassisted and room air  Hydration status: euvolemic  Follow-up not needed.          Vitals Value Taken Time   /58 08/03/20 1603   Temp 35.9 °C (96.6 °F) 08/03/20 1603   Pulse 91 08/03/20 1603   Resp 16 08/03/20 1603   SpO2 94 % 08/03/20 1603         Event Time   Out of Recovery 08/03/2020 15:52:17         Pain/Rosalinda Score: Pain Rating Prior to Med Admin: 3 (8/3/2020  3:39 PM)  Pain Rating Post Med Admin: 3 (8/3/2020  3:39 PM)  Rosalinda Score: 10 (8/3/2020  3:39 PM)

## 2020-08-03 NOTE — PLAN OF CARE
Pt off unit to Surgery. DCA completed per medical record. [place] to follow up with pt for additional d/c needs. Per NILSA Gusman with medical cost assistance, pt does meet criteria for medicaid.        08/03/20 1343   Discharge Assessment   Assessment Type Discharge Planning Assessment   Confirmed/corrected address and phone number on facesheet? No   Assessment information obtained from? Medical Record   Expected Length of Stay (days) 2   Communicated expected length of stay with patient/caregiver no   Prior to hospitilization cognitive status: Unable to Assess   Current cognitive status: Unable to Assess   Current Functional Status: Independent;Needs Assistance   Able to Return to Prior Arrangements yes   Is patient able to care for self after discharge?   (TBD)   Who are your caregiver(s) and their phone number(s)? FarhanaPepper Spouse   [phone]   Patient's perception of discharge disposition home or selfcare   Readmission Within the Last 30 Days no previous admission in last 30 days   Patient currently being followed by outpatient case management? No   Patient currently receives any other outside agency services? No   Equipment Currently Used at Home none   Do you have any problems affording any of your prescribed medications? TBD   Does the patient receive services at the [facility]? No   Discharge Plan A Home   Discharge Plan B Home with family   DME Needed Upon Discharge    (TBD)   Patient/Family in Agreement with Plan unable to assess

## 2020-08-03 NOTE — PLAN OF CARE
Pt AAOx3. IVFs infusing. NGT to LIWS, green/yellow output. Medications administered per order. Pain managed with prn medication. Complains of throat soreness due to NGT. Pt ambulates with standby assist to restroom; urinal at the bedside. NPO, pt verbalized understanding. Preop bath complete. Encouraged to call with questions/concerns/assistance. Will continue to monitor. Safety maintained.

## 2020-08-04 VITALS
RESPIRATION RATE: 18 BRPM | OXYGEN SATURATION: 94 % | WEIGHT: 145.5 LBS | SYSTOLIC BLOOD PRESSURE: 110 MMHG | DIASTOLIC BLOOD PRESSURE: 61 MMHG | HEART RATE: 88 BPM | TEMPERATURE: 98 F | HEIGHT: 63 IN | BODY MASS INDEX: 25.78 KG/M2

## 2020-08-04 PROCEDURE — 94761 N-INVAS EAR/PLS OXIMETRY MLT: CPT

## 2020-08-04 PROCEDURE — 25000003 PHARM REV CODE 250: Performed by: STUDENT IN AN ORGANIZED HEALTH CARE EDUCATION/TRAINING PROGRAM

## 2020-08-04 PROCEDURE — S0030 INJECTION, METRONIDAZOLE: HCPCS | Performed by: STUDENT IN AN ORGANIZED HEALTH CARE EDUCATION/TRAINING PROGRAM

## 2020-08-04 PROCEDURE — 63600175 PHARM REV CODE 636 W HCPCS: Performed by: STUDENT IN AN ORGANIZED HEALTH CARE EDUCATION/TRAINING PROGRAM

## 2020-08-04 RX ORDER — HYDROCODONE BITARTRATE AND ACETAMINOPHEN 5; 325 MG/1; MG/1
1 TABLET ORAL EVERY 6 HOURS PRN
Qty: 10 TABLET | Refills: 0 | Status: ON HOLD | OUTPATIENT
Start: 2020-08-04 | End: 2020-08-18 | Stop reason: HOSPADM

## 2020-08-04 RX ADMIN — OXYCODONE HYDROCHLORIDE 5 MG: 5 TABLET ORAL at 07:08

## 2020-08-04 RX ADMIN — METRONIDAZOLE 500 MG: 500 INJECTION, SOLUTION INTRAVENOUS at 03:08

## 2020-08-04 RX ADMIN — OXYCODONE HYDROCHLORIDE 5 MG: 5 TABLET ORAL at 02:08

## 2020-08-04 RX ADMIN — CIPROFLOXACIN 400 MG: 2 INJECTION, SOLUTION INTRAVENOUS at 12:08

## 2020-08-04 NOTE — PROGRESS NOTES
[facility]  General Surgery  Progress Note    Subjective:       Post-Op Info:  Procedure(s) (LRB):  ROBOTIC REPAIR, HERNIA, INGUINAL (Bilateral)   1 Day Post-Op     Interval History: Doing well this morning. Tolerating regular diet, pain is well controlled, ambulating and voiding without difficulty.     Medications:  Continuous Infusions:  Scheduled Meds:   ciprofloxacin (CIPRO)400mg/200ml D5W IVPB  400 mg Intravenous Q12H    metronidazole  500 mg Intravenous Q8H     PRN Meds:acetaminophen, acetaminophen, melatonin, morphine, ondansetron, ondansetron, oxyCODONE, oxyCODONE, sodium chloride 0.9%     Review of patient's allergies indicates:  No Known Allergies  Objective:     Vital Signs (Most Recent):  Temp: 98.3 °F (36.8 °C) (08/04/20 0738)  Pulse: 88 (08/04/20 0738)  Resp: 18 (08/04/20 0738)  BP: 110/61 (08/04/20 0738)  SpO2: (!) 93 % (08/04/20 0738) Vital Signs (24h Range):  Temp:  [96.6 °F (35.9 °C)-98.6 °F (37 °C)] 98.3 °F (36.8 °C)  Pulse:  [84-99] 88  Resp:  [10-22] 18  SpO2:  [93 %-100 %] 93 %  BP: ()/(52-65) 110/61     Weight: 66 kg (145 lb 8.1 oz)  Body mass index is 25.77 kg/m².    Intake/Output - Last 3 Shifts       08/02 0700 - 08/03 0659 08/03 0700 - 08/04 0659 08/04 0700 - 08/05 0659    I.V. (mL/kg) 1222.9 (19.9) 2200 (33.3)     NG/GT 60      IV Piggyback 700 700     Total Intake(mL/kg) 1982.9 (32.2) 2900 (43.9)     Urine (mL/kg/hr) 1150 (0.8) 1305 (0.8)     Drains 800      Other 250 25     Total Output 2200 1330     Net -217.1 +1570                  Physical Exam  Vitals signs and nursing note reviewed.   Constitutional:       General: He is not in acute distress.  Cardiovascular:      Rate and Rhythm: Normal rate.      Pulses: Normal pulses.   Pulmonary:      Effort: Pulmonary effort is normal. No respiratory distress.   Abdominal:      General: There is no distension.      Palpations: Abdomen is soft.      Tenderness: There is no abdominal tenderness.      Comments:  Incisions clean/dry/intact   Skin:     General: Skin is warm and dry.   Neurological:      Mental Status: He is alert.         Significant Labs:  CBC:   Recent Labs   Lab 08/03/20  0932   WBC 5.83   RBC 4.24*   HGB 14.0   HCT 41.4   *   MCV 98   MCH 33.0*   MCHC 33.8     BMP:   Recent Labs   Lab 08/03/20  0932   *      K 3.7      CO2 30*   BUN 8   CREATININE 0.7   CALCIUM 8.4*     CMP:   Recent Labs   Lab 08/02/20  0429 08/03/20  0932   * 158*   CALCIUM 10.5 8.4*   ALBUMIN 4.6  --    PROT 7.9  --    * 136   K 4.0 3.7   CO2 27 30*   CL 97 103   BUN 12 8   CREATININE 0.8 0.7   ALKPHOS 106  --    ALT 29  --    AST 18  --    BILITOT 1.0  --        Significant Diagnostics:  I have reviewed all pertinent imaging results/findings within the past 24 hours.    Assessment/Plan:     * Incarcerated inguinal hernia  34 YOM s/p bilateral inguinal hernia repair. Doing well this morning, tolerating a regular diet, pain is well controlled. Tolerating a regular diet. Passing flatus, no BM since surgery. Counseled patient on lifting restrictions, local wound care for incisions.     - Regular diet  - Discharge today   - Follow-up with Dr. Andrea in 2 weeks.         Nela Ratliff MD  General Surgery  [facility]

## 2020-08-04 NOTE — PLAN OF CARE
Icelandic only speaking patient. Abdominal lap sited dry and intact. Medication administered per order. Pain managed. Patient ambulated to the bathroom, I/O documented. Will continue to monitor.

## 2020-08-04 NOTE — SUBJECTIVE & OBJECTIVE
Interval History: Doing well this morning. Tolerating regular diet, pain is well controlled, ambulating and voiding without difficulty.     Medications:  Continuous Infusions:  Scheduled Meds:   ciprofloxacin (CIPRO)400mg/200ml D5W IVPB  400 mg Intravenous Q12H    metronidazole  500 mg Intravenous Q8H     PRN Meds:acetaminophen, acetaminophen, melatonin, morphine, ondansetron, ondansetron, oxyCODONE, oxyCODONE, sodium chloride 0.9%     Review of patient's allergies indicates:  No Known Allergies  Objective:     Vital Signs (Most Recent):  Temp: 98.3 °F (36.8 °C) (08/04/20 0738)  Pulse: 88 (08/04/20 0738)  Resp: 18 (08/04/20 0738)  BP: 110/61 (08/04/20 0738)  SpO2: (!) 93 % (08/04/20 0738) Vital Signs (24h Range):  Temp:  [96.6 °F (35.9 °C)-98.6 °F (37 °C)] 98.3 °F (36.8 °C)  Pulse:  [84-99] 88  Resp:  [10-22] 18  SpO2:  [93 %-100 %] 93 %  BP: ()/(52-65) 110/61     Weight: 66 kg (145 lb 8.1 oz)  Body mass index is 25.77 kg/m².    Intake/Output - Last 3 Shifts       08/02 0700 - 08/03 0659 08/03 0700 - 08/04 0659 08/04 0700 - 08/05 0659    I.V. (mL/kg) 1222.9 (19.9) 2200 (33.3)     NG/GT 60      IV Piggyback 700 700     Total Intake(mL/kg) 1982.9 (32.2) 2900 (43.9)     Urine (mL/kg/hr) 1150 (0.8) 1305 (0.8)     Drains 800      Other 250 25     Total Output 2200 1330     Net -217.1 +1570                  Physical Exam  Vitals signs and nursing note reviewed.   Constitutional:       General: He is not in acute distress.  Cardiovascular:      Rate and Rhythm: Normal rate.      Pulses: Normal pulses.   Pulmonary:      Effort: Pulmonary effort is normal. No respiratory distress.   Abdominal:      General: There is no distension.      Palpations: Abdomen is soft.      Tenderness: There is no abdominal tenderness.      Comments: Incisions clean/dry/intact   Skin:     General: Skin is warm and dry.   Neurological:      Mental Status: He is alert.         Significant Labs:  CBC:   Recent Labs   Lab 08/03/20  0932   WBC  5.83   RBC 4.24*   HGB 14.0   HCT 41.4   *   MCV 98   MCH 33.0*   MCHC 33.8     BMP:   Recent Labs   Lab 08/03/20  0932   *      K 3.7      CO2 30*   BUN 8   CREATININE 0.7   CALCIUM 8.4*     CMP:   Recent Labs   Lab 08/02/20  0429 08/03/20  0932   * 158*   CALCIUM 10.5 8.4*   ALBUMIN 4.6  --    PROT 7.9  --    * 136   K 4.0 3.7   CO2 27 30*   CL 97 103   BUN 12 8   CREATININE 0.8 0.7   ALKPHOS 106  --    ALT 29  --    AST 18  --    BILITOT 1.0  --        Significant Diagnostics:  I have reviewed all pertinent imaging results/findings within the past 24 hours.

## 2020-08-04 NOTE — DISCHARGE SUMMARY
[facility]  General Surgery  Discharge Summary      Patient Name: Navi Álvarez  MRN: [id number]  Admission Date: 8/2/2020  Hospital Length of Stay: 2 days  Discharge Date and Time:  08/04/2020 9:03 AM  Attending Physician: Pancho Andrea MD   Discharging Provider: Nela Ratliff MD  Primary Care Provider: Primary Doctor No     HPI: Navi Álvarez is a 34 y.o. male with known bilateral inguinal hernias.  Patient had acute onset of right groin pain and mass yesterday evening.  He notes associated nausea and vomiting times multiple episodes.  The patient has otherwise been in his baseline state of health.  He denies fevers or chills.  He states he did have a bowel movement at some point this morning.  He presented to the [place] ER where CT scan was performed that showed incarcerated right inguinal hernia as well as a left inguinal hernia.  There is no radiographic evidence to support strangulation of the right side.  Patient does not speak English in the history and physical were completed using aid of an .  White blood count is normal his lactic acid is slightly elevated.  He is nontoxic.    Procedure(s) (LRB):  ROBOTIC REPAIR, HERNIA, INGUINAL (Bilateral)  EXCISION, LIPOMA     Hospital Course: Patient was admitted to the general surgery service on 8/2/20, with plan for robotic-assisted bilateral inguinal hernia repair on 8/3/20. Patient underwent above stated procedure, without complication. He was transferred to the floor after an uncomplicated recovery period. He was started on a clear liquid diet, advanced to regular diet as tolerated. On post-operative day one, his pain is well controlled, ambulating and voiding without difficulty, and incisions healing well. He is deemed ready for discharge. He will follow-up with Dr. Andrea in 2 weeks. He was counseled on lifting restrictions, no more than 20 lbs for at least 3 weeks.     Consults: None    Significant  Diagnostic Studies: Labs:   BMP:   Recent Labs   Lab 08/03/20  0932   *      K 3.7      CO2 30*   BUN 8   CREATININE 0.7   CALCIUM 8.4*   , CMP   Recent Labs   Lab 08/03/20  0932      K 3.7      CO2 30*   *   BUN 8   CREATININE 0.7   CALCIUM 8.4*   ANIONGAP 3*   ESTGFRAFRICA >60   EGFRNONAA >60    and CBC   Recent Labs   Lab 08/03/20  0932   WBC 5.83   HGB 14.0   HCT 41.4   *       Pending Diagnostic Studies:     Procedure Component Value Units Date/Time    Specimen to Pathology, Surgery General Surgery [[id number]] Collected: 08/03/20 1358    Order Status: Sent Lab Status: In process Updated: 08/03/20 1633        Final Active Diagnoses:    Diagnosis Date Noted POA    PRINCIPAL PROBLEM:  Incarcerated inguinal hernia [K40.30]  Yes      Problems Resolved During this Admission:      Discharged Condition: good    Disposition: Home or Self Care    Follow Up:  Follow-up Information     Pancho Andrea MD. Schedule an appointment as soon as possible for a visit in 2 weeks.    Specialty: General Surgery  Why: Post-op appointment, s/p bilateral inguinal hernia repair   Contact information:  [address]  [address]  [facility] [place] [facility] [address] [phone]                 Patient Instructions:      Diet Adult Regular     Lifting restrictions     Notify your health care provider if you experience any of the following:  temperature >100.4     Notify your health care provider if you experience any of the following:  persistent nausea and vomiting or diarrhea     Notify your health care provider if you experience any of the following:  severe uncontrolled pain     Notify your health care provider if you experience any of the following:  redness, tenderness, or signs of infection (pain, swelling, redness, odor or green/yellow discharge around incision site)     Medications:  Reconciled Home Medications:      Medication List      START taking these medications    HYDROcodone-acetaminophen  5-325 mg per tablet  Commonly known as: NORCO  [place] nishant tableta por vía oral cada 6 horas según sea necesario para el dolor.  (Take 1 tablet by mouth every 6 (six) hours as needed for Pain.)            Nela Ratliff MD  General Surgery  [facility]

## 2020-08-04 NOTE — ASSESSMENT & PLAN NOTE
34 YOM s/p bilateral inguinal hernia repair. Doing well this morning, tolerating a regular diet, pain is well controlled. Tolerating a regular diet. Passing flatus, no BM since surgery. Counseled patient on lifting restrictions, local wound care for incisions.     - Regular diet  - Discharge today   - Follow-up with Dr. Andrea in 2 weeks.

## 2020-08-04 NOTE — PLAN OF CARE
Discharge orders noted, No HH or DME noted.    Discharge rounds on patient. Discussed followup appointments, blue discharge folder, discharge nurse will go over home medications and reasons for medications and final discharge instructions. All patient/caregiver questions answered. Patient verbalized understanding.    Future Appointments   Date Time Provider Department Center   8/19/2020  9:40 AM Pancho Andrea MD [facility]        08/04/20 1133   Final Note   Assessment Type Final Discharge Note   Anticipated Discharge Disposition Home   What phone number can be called within the next 1-3 days to see how you are doing after discharge? [phone]   Hospital Follow Up  Appt(s) scheduled? Yes   Discharge plans and expectations educations in teach back method with documentation complete? Yes   Right Care Referral Info   Post Acute Recommendation No Care

## 2020-08-04 NOTE — PLAN OF CARE
Discharge orders noted. Additional clinical references attached.    Patient's discharge instructions given by bedside RN and reviewed via this VN.  Education provided on new medication, diagnosis, and follow-up appointments. Patient verbalized understanding. All questions answered. Patient to  new prescription when family arrives for

## 2020-08-07 LAB
FINAL PATHOLOGIC DIAGNOSIS: NORMAL
GROSS: NORMAL

## 2020-08-17 ENCOUNTER — HOSPITAL ENCOUNTER (INPATIENT)
Facility: HOSPITAL | Age: 35
LOS: 1 days | Discharge: HOME | DRG: 337 | End: 2020-08-18
Attending: EMERGENCY MEDICINE | Admitting: SURGERY

## 2020-08-17 ENCOUNTER — ANESTHESIA (OUTPATIENT)
Dept: SURGERY | Facility: HOSPITAL | Age: 35
DRG: 337 | End: 2020-08-17

## 2020-08-17 ENCOUNTER — ANESTHESIA EVENT (OUTPATIENT)
Dept: SURGERY | Facility: HOSPITAL | Age: 35
DRG: 337 | End: 2020-08-17

## 2020-08-17 DIAGNOSIS — K56.609 SMALL BOWEL OBSTRUCTION: Primary | ICD-10-CM

## 2020-08-17 DIAGNOSIS — K56.609 SBO (SMALL BOWEL OBSTRUCTION): ICD-10-CM

## 2020-08-17 LAB
ALBUMIN SERPL BCP-MCNC: 2.9 G/DL (ref 3.5–5.2)
ALP SERPL-CCNC: 80 U/L (ref 55–135)
ALT SERPL W/O P-5'-P-CCNC: 19 U/L (ref 10–44)
ANION GAP SERPL CALC-SCNC: 13 MMOL/L (ref 8–16)
AST SERPL-CCNC: 12 U/L (ref 10–40)
BASOPHILS # BLD AUTO: 0.05 K/UL (ref 0–0.2)
BASOPHILS NFR BLD: 0.3 % (ref 0–1.9)
BILIRUB SERPL-MCNC: 0.4 MG/DL (ref 0.1–1)
BILIRUB UR QL STRIP: NEGATIVE
BUN SERPL-MCNC: 19 MG/DL (ref 6–20)
CALCIUM SERPL-MCNC: 7.1 MG/DL (ref 8.7–10.5)
CHLORIDE SERPL-SCNC: 109 MMOL/L (ref 95–110)
CLARITY UR: CLEAR
CO2 SERPL-SCNC: 15 MMOL/L (ref 23–29)
COLOR UR: YELLOW
CREAT SERPL-MCNC: 0.8 MG/DL (ref 0.5–1.4)
DIFFERENTIAL METHOD: ABNORMAL
EOSINOPHIL # BLD AUTO: 0 K/UL (ref 0–0.5)
EOSINOPHIL NFR BLD: 0.2 % (ref 0–8)
ERYTHROCYTE [DISTWIDTH] IN BLOOD BY AUTOMATED COUNT: 11 % (ref 11.5–14.5)
EST. GFR  (AFRICAN AMERICAN): >60 ML/MIN/1.73 M^2
EST. GFR  (NON AFRICAN AMERICAN): >60 ML/MIN/1.73 M^2
GLUCOSE SERPL-MCNC: 400 MG/DL (ref 70–110)
GLUCOSE UR QL STRIP: ABNORMAL
HCT VFR BLD AUTO: 47.2 % (ref 40–54)
HGB BLD-MCNC: 16.9 G/DL (ref 14–18)
HGB UR QL STRIP: NEGATIVE
IMM GRANULOCYTES # BLD AUTO: 0.07 K/UL (ref 0–0.04)
IMM GRANULOCYTES NFR BLD AUTO: 0.4 % (ref 0–0.5)
KETONES UR QL STRIP: NEGATIVE
LACTATE SERPL-SCNC: 2 MMOL/L (ref 0.5–2.2)
LACTATE SERPL-SCNC: 3.1 MMOL/L (ref 0.5–2.2)
LEUKOCYTE ESTERASE UR QL STRIP: NEGATIVE
LIPASE SERPL-CCNC: 35 U/L (ref 4–60)
LYMPHOCYTES # BLD AUTO: 2.9 K/UL (ref 1–4.8)
LYMPHOCYTES NFR BLD: 17.2 % (ref 18–48)
MCH RBC QN AUTO: 32.8 PG (ref 27–31)
MCHC RBC AUTO-ENTMCNC: 35.8 G/DL (ref 32–36)
MCV RBC AUTO: 92 FL (ref 82–98)
MONOCYTES # BLD AUTO: 0.4 K/UL (ref 0.3–1)
MONOCYTES NFR BLD: 2.3 % (ref 4–15)
NEUTROPHILS # BLD AUTO: 13.2 K/UL (ref 1.8–7.7)
NEUTROPHILS NFR BLD: 79.6 % (ref 38–73)
NITRITE UR QL STRIP: NEGATIVE
NRBC BLD-RTO: 0 /100 WBC
PH UR STRIP: 6 [PH] (ref 5–8)
PLATELET # BLD AUTO: 351 K/UL (ref 150–350)
PMV BLD AUTO: 9.8 FL (ref 9.2–12.9)
POTASSIUM SERPL-SCNC: 2.9 MMOL/L (ref 3.5–5.1)
PROT SERPL-MCNC: 5.2 G/DL (ref 6–8.4)
PROT UR QL STRIP: NEGATIVE
RBC # BLD AUTO: 5.15 M/UL (ref 4.6–6.2)
SARS-COV-2 RDRP RESP QL NAA+PROBE: NEGATIVE
SODIUM SERPL-SCNC: 137 MMOL/L (ref 136–145)
SP GR UR STRIP: 1.01 (ref 1–1.03)
URN SPEC COLLECT METH UR: ABNORMAL
UROBILINOGEN UR STRIP-ACNC: NEGATIVE EU/DL
WBC # BLD AUTO: 16.65 K/UL (ref 3.9–12.7)

## 2020-08-17 PROCEDURE — 63600175 PHARM REV CODE 636 W HCPCS: Performed by: EMERGENCY MEDICINE

## 2020-08-17 PROCEDURE — 25000003 PHARM REV CODE 250: Performed by: STUDENT IN AN ORGANIZED HEALTH CARE EDUCATION/TRAINING PROGRAM

## 2020-08-17 PROCEDURE — 80053 COMPREHEN METABOLIC PANEL: CPT

## 2020-08-17 PROCEDURE — 99222 1ST HOSP IP/OBS MODERATE 55: CPT | Mod: 57,24,, | Performed by: SURGERY

## 2020-08-17 PROCEDURE — 96375 TX/PRO/DX INJ NEW DRUG ADDON: CPT

## 2020-08-17 PROCEDURE — 81003 URINALYSIS AUTO W/O SCOPE: CPT

## 2020-08-17 PROCEDURE — 37000009 HC ANESTHESIA EA ADD 15 MINS: Performed by: SURGERY

## 2020-08-17 PROCEDURE — 71000039 HC RECOVERY, EACH ADD'L HOUR: Performed by: SURGERY

## 2020-08-17 PROCEDURE — 96365 THER/PROPH/DIAG IV INF INIT: CPT

## 2020-08-17 PROCEDURE — 25000003 PHARM REV CODE 250: Performed by: SURGERY

## 2020-08-17 PROCEDURE — 63600175 PHARM REV CODE 636 W HCPCS: Performed by: STUDENT IN AN ORGANIZED HEALTH CARE EDUCATION/TRAINING PROGRAM

## 2020-08-17 PROCEDURE — 25000003 PHARM REV CODE 250: Performed by: EMERGENCY MEDICINE

## 2020-08-17 PROCEDURE — 63600175 PHARM REV CODE 636 W HCPCS: Performed by: SURGERY

## 2020-08-17 PROCEDURE — P9612 CATHETERIZE FOR URINE SPEC: HCPCS

## 2020-08-17 PROCEDURE — 27201423 OPTIME MED/SURG SUP & DEVICES STERILE SUPPLY: Performed by: SURGERY

## 2020-08-17 PROCEDURE — 99900035 HC TECH TIME PER 15 MIN (STAT)

## 2020-08-17 PROCEDURE — 44180 PR LAP, SURG ENTEROLYSIS: ICD-10-PCS | Mod: 79,,, | Performed by: SURGERY

## 2020-08-17 PROCEDURE — 44180 LAP ENTEROLYSIS: CPT | Mod: 79,,, | Performed by: SURGERY

## 2020-08-17 PROCEDURE — 99291 CRITICAL CARE FIRST HOUR: CPT | Mod: 25

## 2020-08-17 PROCEDURE — 83605 ASSAY OF LACTIC ACID: CPT

## 2020-08-17 PROCEDURE — 36000708 HC OR TIME LEV III 1ST 15 MIN: Performed by: SURGERY

## 2020-08-17 PROCEDURE — 96361 HYDRATE IV INFUSION ADD-ON: CPT

## 2020-08-17 PROCEDURE — 96376 TX/PRO/DX INJ SAME DRUG ADON: CPT

## 2020-08-17 PROCEDURE — 63600175 PHARM REV CODE 636 W HCPCS: Performed by: ANESTHESIOLOGY

## 2020-08-17 PROCEDURE — 87040 BLOOD CULTURE FOR BACTERIA: CPT

## 2020-08-17 PROCEDURE — 27000221 HC OXYGEN, UP TO 24 HOURS

## 2020-08-17 PROCEDURE — 94761 N-INVAS EAR/PLS OXIMETRY MLT: CPT

## 2020-08-17 PROCEDURE — 36000709 HC OR TIME LEV III EA ADD 15 MIN: Performed by: SURGERY

## 2020-08-17 PROCEDURE — 25500020 PHARM REV CODE 255: Performed by: EMERGENCY MEDICINE

## 2020-08-17 PROCEDURE — 71000033 HC RECOVERY, INTIAL HOUR: Performed by: SURGERY

## 2020-08-17 PROCEDURE — 11000001 HC ACUTE MED/SURG PRIVATE ROOM

## 2020-08-17 PROCEDURE — 99222 PR INITIAL HOSPITAL CARE,LEVL II: ICD-10-PCS | Mod: 57,24,, | Performed by: SURGERY

## 2020-08-17 PROCEDURE — 37000008 HC ANESTHESIA 1ST 15 MINUTES: Performed by: SURGERY

## 2020-08-17 PROCEDURE — 83690 ASSAY OF LIPASE: CPT

## 2020-08-17 PROCEDURE — 85025 COMPLETE CBC W/AUTO DIFF WBC: CPT

## 2020-08-17 PROCEDURE — U0002 COVID-19 LAB TEST NON-CDC: HCPCS

## 2020-08-17 RX ORDER — MORPHINE SULFATE 4 MG/ML
4 INJECTION, SOLUTION INTRAMUSCULAR; INTRAVENOUS
Status: COMPLETED | OUTPATIENT
Start: 2020-08-17 | End: 2020-08-17

## 2020-08-17 RX ORDER — HEPARIN SODIUM 5000 [USP'U]/ML
5000 INJECTION, SOLUTION INTRAVENOUS; SUBCUTANEOUS EVERY 8 HOURS
Status: DISCONTINUED | OUTPATIENT
Start: 2020-08-17 | End: 2020-08-17 | Stop reason: HOSPADM

## 2020-08-17 RX ORDER — ROCURONIUM BROMIDE 10 MG/ML
INJECTION, SOLUTION INTRAVENOUS
Status: DISCONTINUED | OUTPATIENT
Start: 2020-08-17 | End: 2020-08-17

## 2020-08-17 RX ORDER — ONDANSETRON 2 MG/ML
4 INJECTION INTRAMUSCULAR; INTRAVENOUS
Status: COMPLETED | OUTPATIENT
Start: 2020-08-17 | End: 2020-08-17

## 2020-08-17 RX ORDER — SODIUM CHLORIDE 0.9 % (FLUSH) 0.9 %
10 SYRINGE (ML) INJECTION
Status: DISCONTINUED | OUTPATIENT
Start: 2020-08-17 | End: 2020-08-18 | Stop reason: HOSPADM

## 2020-08-17 RX ORDER — SODIUM CHLORIDE, SODIUM LACTATE, POTASSIUM CHLORIDE, CALCIUM CHLORIDE 600; 310; 30; 20 MG/100ML; MG/100ML; MG/100ML; MG/100ML
INJECTION, SOLUTION INTRAVENOUS CONTINUOUS
Status: DISCONTINUED | OUTPATIENT
Start: 2020-08-17 | End: 2020-08-18 | Stop reason: HOSPADM

## 2020-08-17 RX ORDER — FENTANYL CITRATE 50 UG/ML
INJECTION, SOLUTION INTRAMUSCULAR; INTRAVENOUS
Status: DISCONTINUED | OUTPATIENT
Start: 2020-08-17 | End: 2020-08-17

## 2020-08-17 RX ORDER — SODIUM CHLORIDE, SODIUM LACTATE, POTASSIUM CHLORIDE, CALCIUM CHLORIDE 600; 310; 30; 20 MG/100ML; MG/100ML; MG/100ML; MG/100ML
INJECTION, SOLUTION INTRAVENOUS CONTINUOUS
Status: DISCONTINUED | OUTPATIENT
Start: 2020-08-17 | End: 2020-08-17

## 2020-08-17 RX ORDER — MIDAZOLAM HYDROCHLORIDE 1 MG/ML
INJECTION, SOLUTION INTRAMUSCULAR; INTRAVENOUS
Status: DISCONTINUED | OUTPATIENT
Start: 2020-08-17 | End: 2020-08-17

## 2020-08-17 RX ORDER — OXYCODONE HYDROCHLORIDE 5 MG/1
5 TABLET ORAL EVERY 6 HOURS PRN
Status: DISCONTINUED | OUTPATIENT
Start: 2020-08-17 | End: 2020-08-17

## 2020-08-17 RX ORDER — DEXAMETHASONE SODIUM PHOSPHATE 4 MG/ML
INJECTION, SOLUTION INTRA-ARTICULAR; INTRALESIONAL; INTRAMUSCULAR; INTRAVENOUS; SOFT TISSUE
Status: DISCONTINUED | OUTPATIENT
Start: 2020-08-17 | End: 2020-08-17

## 2020-08-17 RX ORDER — LIDOCAINE HYDROCHLORIDE AND EPINEPHRINE 10; 10 MG/ML; UG/ML
INJECTION, SOLUTION INFILTRATION; PERINEURAL
Status: DISCONTINUED | OUTPATIENT
Start: 2020-08-17 | End: 2020-08-17 | Stop reason: HOSPADM

## 2020-08-17 RX ORDER — SODIUM CHLORIDE 9 MG/ML
INJECTION, SOLUTION INTRAVENOUS CONTINUOUS
Status: DISCONTINUED | OUTPATIENT
Start: 2020-08-17 | End: 2020-08-17

## 2020-08-17 RX ORDER — PHENYLEPHRINE HYDROCHLORIDE 10 MG/ML
INJECTION INTRAVENOUS
Status: DISCONTINUED | OUTPATIENT
Start: 2020-08-17 | End: 2020-08-17

## 2020-08-17 RX ORDER — ACETAMINOPHEN 325 MG/1
650 TABLET ORAL EVERY 8 HOURS PRN
Status: DISCONTINUED | OUTPATIENT
Start: 2020-08-17 | End: 2020-08-18 | Stop reason: HOSPADM

## 2020-08-17 RX ORDER — LIDOCAINE HYDROCHLORIDE 20 MG/ML
INJECTION INTRAVENOUS
Status: DISCONTINUED | OUTPATIENT
Start: 2020-08-17 | End: 2020-08-17

## 2020-08-17 RX ORDER — PROPOFOL 10 MG/ML
VIAL (ML) INTRAVENOUS
Status: DISCONTINUED | OUTPATIENT
Start: 2020-08-17 | End: 2020-08-17

## 2020-08-17 RX ORDER — ONDANSETRON 2 MG/ML
4 INJECTION INTRAMUSCULAR; INTRAVENOUS ONCE AS NEEDED
Status: DISCONTINUED | OUTPATIENT
Start: 2020-08-17 | End: 2020-08-17 | Stop reason: HOSPADM

## 2020-08-17 RX ORDER — HYDROMORPHONE HYDROCHLORIDE 2 MG/ML
0.5 INJECTION, SOLUTION INTRAMUSCULAR; INTRAVENOUS; SUBCUTANEOUS EVERY 5 MIN PRN
Status: DISCONTINUED | OUTPATIENT
Start: 2020-08-17 | End: 2020-08-17 | Stop reason: HOSPADM

## 2020-08-17 RX ORDER — SUCCINYLCHOLINE CHLORIDE 20 MG/ML
INJECTION INTRAMUSCULAR; INTRAVENOUS
Status: DISCONTINUED | OUTPATIENT
Start: 2020-08-17 | End: 2020-08-17

## 2020-08-17 RX ORDER — ONDANSETRON 8 MG/1
8 TABLET, ORALLY DISINTEGRATING ORAL EVERY 8 HOURS PRN
Status: DISCONTINUED | OUTPATIENT
Start: 2020-08-17 | End: 2020-08-18 | Stop reason: HOSPADM

## 2020-08-17 RX ORDER — MORPHINE SULFATE 2 MG/ML
2 INJECTION, SOLUTION INTRAMUSCULAR; INTRAVENOUS EVERY 6 HOURS PRN
Status: DISCONTINUED | OUTPATIENT
Start: 2020-08-17 | End: 2020-08-17

## 2020-08-17 RX ORDER — GLYCOPYRROLATE 0.2 MG/ML
INJECTION INTRAMUSCULAR; INTRAVENOUS
Status: DISCONTINUED | OUTPATIENT
Start: 2020-08-17 | End: 2020-08-17

## 2020-08-17 RX ORDER — NEOSTIGMINE METHYLSULFATE 1 MG/ML
INJECTION, SOLUTION INTRAVENOUS
Status: DISCONTINUED | OUTPATIENT
Start: 2020-08-17 | End: 2020-08-17

## 2020-08-17 RX ORDER — ONDANSETRON 2 MG/ML
4 INJECTION INTRAMUSCULAR; INTRAVENOUS EVERY 6 HOURS PRN
Status: DISCONTINUED | OUTPATIENT
Start: 2020-08-17 | End: 2020-08-17 | Stop reason: HOSPADM

## 2020-08-17 RX ORDER — BUPIVACAINE HYDROCHLORIDE 5 MG/ML
INJECTION, SOLUTION PERINEURAL
Status: DISCONTINUED | OUTPATIENT
Start: 2020-08-17 | End: 2020-08-17 | Stop reason: HOSPADM

## 2020-08-17 RX ORDER — MORPHINE SULFATE 4 MG/ML
4 INJECTION, SOLUTION INTRAMUSCULAR; INTRAVENOUS EVERY 4 HOURS PRN
Status: DISCONTINUED | OUTPATIENT
Start: 2020-08-17 | End: 2020-08-18 | Stop reason: HOSPADM

## 2020-08-17 RX ADMIN — ROCURONIUM BROMIDE 10 MG: 10 INJECTION, SOLUTION INTRAVENOUS at 12:08

## 2020-08-17 RX ADMIN — IOHEXOL 100 ML: 350 INJECTION, SOLUTION INTRAVENOUS at 07:08

## 2020-08-17 RX ADMIN — SODIUM CHLORIDE 1000 ML: 0.9 INJECTION, SOLUTION INTRAVENOUS at 05:08

## 2020-08-17 RX ADMIN — MORPHINE SULFATE 4 MG: 4 INJECTION INTRAVENOUS at 06:08

## 2020-08-17 RX ADMIN — SODIUM CHLORIDE 800 ML: 0.9 INJECTION, SOLUTION INTRAVENOUS at 06:08

## 2020-08-17 RX ADMIN — ONDANSETRON 4 MG: 2 INJECTION INTRAMUSCULAR; INTRAVENOUS at 03:08

## 2020-08-17 RX ADMIN — SODIUM CHLORIDE, SODIUM LACTATE, POTASSIUM CHLORIDE, AND CALCIUM CHLORIDE 500 ML: .6; .31; .03; .02 INJECTION, SOLUTION INTRAVENOUS at 10:08

## 2020-08-17 RX ADMIN — SODIUM CHLORIDE, SODIUM LACTATE, POTASSIUM CHLORIDE, AND CALCIUM CHLORIDE: .6; .31; .03; .02 INJECTION, SOLUTION INTRAVENOUS at 11:08

## 2020-08-17 RX ADMIN — FENTANYL CITRATE 100 MCG: 50 INJECTION, SOLUTION INTRAMUSCULAR; INTRAVENOUS at 11:08

## 2020-08-17 RX ADMIN — SODIUM CHLORIDE, SODIUM LACTATE, POTASSIUM CHLORIDE, AND CALCIUM CHLORIDE: .6; .31; .03; .02 INJECTION, SOLUTION INTRAVENOUS at 04:08

## 2020-08-17 RX ADMIN — NEOSTIGMINE METHYLSULFATE 5 MG: 1 INJECTION INTRAVENOUS at 12:08

## 2020-08-17 RX ADMIN — ONDANSETRON 8 MG: 8 TABLET, ORALLY DISINTEGRATING ORAL at 09:08

## 2020-08-17 RX ADMIN — DEXAMETHASONE SODIUM PHOSPHATE 4 MG: 4 INJECTION, SOLUTION INTRA-ARTICULAR; INTRALESIONAL; INTRAMUSCULAR; INTRAVENOUS; SOFT TISSUE at 12:08

## 2020-08-17 RX ADMIN — HYDROMORPHONE HYDROCHLORIDE 0.5 MG: 2 INJECTION, SOLUTION INTRAMUSCULAR; INTRAVENOUS; SUBCUTANEOUS at 01:08

## 2020-08-17 RX ADMIN — MORPHINE SULFATE 4 MG: 4 INJECTION INTRAVENOUS at 04:08

## 2020-08-17 RX ADMIN — ROCURONIUM BROMIDE 30 MG: 10 INJECTION, SOLUTION INTRAVENOUS at 11:08

## 2020-08-17 RX ADMIN — LIDOCAINE HYDROCHLORIDE 80 MG: 20 INJECTION, SOLUTION INTRAVENOUS at 11:08

## 2020-08-17 RX ADMIN — ONDANSETRON 4 MG: 2 INJECTION INTRAMUSCULAR; INTRAVENOUS at 10:08

## 2020-08-17 RX ADMIN — MIDAZOLAM 2 MG: 1 INJECTION INTRAMUSCULAR; INTRAVENOUS at 11:08

## 2020-08-17 RX ADMIN — GLYCOPYRROLATE 0.8 MG: 0.2 INJECTION, SOLUTION INTRAMUSCULAR; INTRAVENOUS at 12:08

## 2020-08-17 RX ADMIN — PROPOFOL 180 MG: 10 INJECTION, EMULSION INTRAVENOUS at 11:08

## 2020-08-17 RX ADMIN — SUCCINYLCHOLINE CHLORIDE 120 MG: 20 INJECTION, SOLUTION INTRAMUSCULAR; INTRAVENOUS at 11:08

## 2020-08-17 RX ADMIN — PIPERACILLIN AND TAZOBACTAM 4.5 G: 4; .5 INJECTION, POWDER, LYOPHILIZED, FOR SOLUTION INTRAVENOUS; PARENTERAL at 12:08

## 2020-08-17 RX ADMIN — ONDANSETRON 4 MG: 2 INJECTION INTRAMUSCULAR; INTRAVENOUS at 12:08

## 2020-08-17 RX ADMIN — PHENYLEPHRINE HYDROCHLORIDE 200 MCG: 10 INJECTION INTRAVENOUS at 12:08

## 2020-08-17 RX ADMIN — HYDROMORPHONE HYDROCHLORIDE 0.5 MG: 2 INJECTION, SOLUTION INTRAMUSCULAR; INTRAVENOUS; SUBCUTANEOUS at 02:08

## 2020-08-17 RX ADMIN — PIPERACILLIN AND TAZOBACTAM 4.5 G: 4; .5 INJECTION, POWDER, LYOPHILIZED, FOR SOLUTION INTRAVENOUS; PARENTERAL at 05:08

## 2020-08-17 NOTE — BRIEF OP NOTE
[facility]  Brief Operative Note    SUMMARY     Surgery Date: 8/17/2020     Surgeon(s) and Role:     * Pancho Andrea MD - Primary    Assisting Surgeon: None    Pre-op Diagnosis:  Small bowel obstruction [K56.609]    Post-op Diagnosis:  Post-Op Diagnosis Codes:     * Small bowel obstruction [K56.609]    Procedure(s) (LRB):  LAPAROSCOPY, DIAGNOSTIC, possible laparotomy, other as indicated (N/A)  LYSIS, ADHESIONS    Anesthesia: General    Description of Procedure: see op note    Description of the findings of the procedure: PSBO due to ashesion to anterior abdominal wall, all bowel viable    Estimated Blood Loss: * No values recorded between 8/17/2020 12:14 PM and 8/17/2020 12:52 PM *         Specimens:   Specimen (12h ago, onward)    None

## 2020-08-17 NOTE — OP NOTE
PATIENT: Navi Álvarez    MRN: [id number]    DATE OF PROCEDURE: 08/17/2020    PREOPERATIVE DIAGNOSIS:  Small-bowel obstruction status post robotic bilateral inguinal hernia repair    POSTOPERATIVE DIAGNOSIS:  Partial small-bowel obstruction due to adhesion to the anterior abdominal wall    PROCEDURE:  Diagnostic laparoscopy with laparoscopic lysis of adhesions    SURGEON: Pancho Andrea M.D., YVES    ASSISTANT: None    ANESTHESIA:  General trach    ESTIMATED BLOOD LOSS:  Less than 10 cc    SPECIMEN:  None    CONDITION:  Stable    COMPLICATIONS: None    FINDINGS:   There was a partial small-bowel obstruction due to the bowel being adhesed to the anterior abdominal wall in the right lower quadrant.  There was no exposed V-lock suture.  There was no recurrence of the bilateral hernias.    INDICATIONS: The patient is a 34-year-old  male who is 2 weeks out from a robotic repair of bilateral inguinal hernias.  He had sudden onset of abdominal pain, bloating, nausea and vomiting last night.  CT scan was performed in the emergency room that showed evidence of a partial bowel obstruction with a transition in the right lower quadrant.  Patient was taken to surgery for diagnostic laparoscopy for relieve the bowel obstruction.  The risks, benefits, and alternatives to the procedure were explained to the patient in detail.  The risks include, but not limited to bleeding, infection, reoperation, injury to surrounding structures, reactions to anesthetic medications, incisional hernias, perioperative cardiac/pulmonary/probable acute events, recurrence hernia or small bowel obstruction and possible death.  Patient stated clear understanding of the risks and requested the procedure be done.    PROCEDURE IN DETAIL:  After surgical consent was obtained, the patient was transported to the operative theater and onto the operating room table in a supine position.  General endotracheal anesthesia was administered  without difficulty.  Patient's abdomen was prepped and draped in standard sterile fashion.  Perioperative antibiotics were given and a time-out was performed in order to ensure the proper patient procedure.  An incision was made in a curvilinear fashion above the umbilicus.  A small pre-existing umbilical hernia defect was encountered an enlarged bluntly.  A trocar was inserted in the abdominal cavity was insufflated.  Under direct visualization, 2 additional 5 mm trocars were inserted without difficulty.  Upon examing the abdomen, we could see a piece of bowel that was adhesed to the anterior abdominal wall and partially twisted, causing the obstruction.  It was stuck up to an area on the right that corresponded to the beginning of the V-lock suture although there was no exposed suture.  We lysed the adhesion and freed the small bowel.  The entire small bowel was viable.  We examined both previous hernia repairs and there was no evidence of recurrence or exposed V-lock suture.  At this point, we removed all the trocars and desufflated the abdomen.  The fascial defect at the umbilicus was closed using a figure-of-eight 0 Vicryl suture.  The incisions were all irrigated out with sterile saline and rechecked with local anesthetic.  The skin was closed with interrupted Monocryl sutures.  The wounds were dressed in a standard sterile fashion.  At the end of the procedure, the instrument, lap, and needle counts were all correct.  The patient was woke from anesthesia having tolerated the procedure without difficulty returned to the PACU in a stable condition.    DISPO: Return to floor bed      Pancho Andrea M.D., F.A.C.S.  [facility] and General Surgery  [facility]

## 2020-08-17 NOTE — PLAN OF CARE
Problem: Adult Inpatient Plan of Care  Goal: Plan of Care Review  Outcome: Ongoing, Progressing   Pt AAOX4. Safety precautions maintained. Bed low and locked, call light within reach. Medications administered per MAR. Pt remains NPO. NG tube to LIWS, monitored for output, productive of small amount of green/red/brown fluid. DVT prophylaxis continued. IVF infusing. Hourly rounding performed. Encouraged to call for OOB assistance. Awaiting pt disposition. Pt updated on plan of care and verbalizes understanding.

## 2020-08-17 NOTE — ED PROVIDER NOTES
Encounter Date: 8/17/2020       History     Chief Complaint   Patient presents with    Abdominal Pain     Pt presents via  ems secondary to generalized abdominal pain. Denies n/v/d. Reports he had a hernia repair on 8/4     Mr. Redd presents with severe abdominal pain that began earlier tonight at 22:30.  His abdomen feels bloated like it is going to burst.  He vomited upon arrival to the ED. He denies fever and chills.  He denies chest pain.  He denies cough shortness of breath.  He denies diarrhea and constipation and has not noticed blood in his stools.  He denies pain with urination, difficulty urinating, and hematuria.  He did not take any medicines before coming to the ED.  There are no exacerbating or alleviating factors.     Documented history:  None on file.    The history is provided by the patient. The history is limited by a language barrier. A  was used (ED nursing staff).     Review of patient's allergies indicates:  No Known Allergies  No past medical history on file.  Past Surgical History:   Procedure Laterality Date    LIPOMA RESECTION  8/3/2020    Procedure: EXCISION, LIPOMA;  Surgeon: Pancho Andrea MD;  Location: [facility] OR;  Service: General;;    ROBOT-ASSISTED LAPAROSCOPIC REPAIR OF INGUINAL HERNIA Bilateral 8/3/2020    Procedure: ROBOTIC REPAIR, HERNIA, INGUINAL;  Surgeon: Pancho Andrea MD;  Location: [facility] OR;  Service: General;  Laterality: Bilateral;     No family history on file.  Social History     Tobacco Use    Smoking status: Never Smoker    Smokeless tobacco: Current User     Types: Chew   Substance Use Topics    Alcohol use: Yes     Alcohol/week: 4.0 - 6.0 standard drinks     Types: 4 - 6 Cans of beer per week     Comment: SOMETIMES     Drug use: Never     Review of Systems   Constitutional: Negative for chills and fever.   HENT: Negative for sore throat and trouble swallowing.    Eyes: Negative for visual disturbance.   Respiratory: Negative for  cough and shortness of breath.    Cardiovascular: Negative for chest pain.   Gastrointestinal: Positive for abdominal pain and vomiting. Negative for blood in stool, constipation and diarrhea.   Endocrine: Negative for polydipsia and polyuria.   Genitourinary: Negative for dysuria and hematuria.   Allergic/Immunologic: Negative for immunocompromised state.   Neurological: Negative for dizziness and headaches.   Hematological: Does not bruise/bleed easily.       Physical Exam     Initial Vitals [08/17/20 0336]   BP Pulse Resp Temp SpO2   103/64 88 20 99 °F (37.2 °C) 99 %      MAP       --         Physical Exam    Nursing note and vitals reviewed.  Constitutional: He appears well-developed and well-nourished. He is not diaphoretic. He appears distressed.   HENT:   Head: Normocephalic and atraumatic.   Mouth/Throat: Oropharynx is clear and moist.   Eyes: Conjunctivae are normal. No scleral icterus.   Neck: No JVD present.   Cardiovascular: Normal rate, regular rhythm and normal heart sounds. Exam reveals no gallop and no friction rub.    No murmur heard.  Pulmonary/Chest: Effort normal and breath sounds normal. No stridor. No respiratory distress. He has no decreased breath sounds. He has no wheezes. He has no rhonchi. He has no rales.   Abdominal: He exhibits distension. There is generalized abdominal tenderness. There is rigidity and guarding.   Neurological: He is alert and oriented to person, place, and time. He has normal strength. No cranial nerve deficit. Coordination normal. GCS score is 15. GCS eye subscore is 4. GCS verbal subscore is 5. GCS motor subscore is 6.   Skin: Skin is warm and dry. No pallor.         ED Course   Procedures  Labs Reviewed   CBC W/ AUTO DIFFERENTIAL - Abnormal; Notable for the following components:       Result Value    WBC 16.65 (*)     Mean Corpuscular Hemoglobin 32.8 (*)     RDW 11.0 (*)     Platelets 351 (*)     Gran # (ANC) 13.2 (*)     Immature Grans (Abs) 0.07 (*)     Gran%  79.6 (*)     Lymph% 17.2 (*)     Mono% 2.3 (*)     All other components within normal limits   LACTIC ACID, PLASMA - Abnormal; Notable for the following components:    Lactate (Lactic Acid) 3.1 (*)     All other components within normal limits   CULTURE, BLOOD   CULTURE, BLOOD   URINALYSIS, REFLEX TO URINE CULTURE   COMPREHENSIVE METABOLIC PANEL   LIPASE          Imaging Results           X-Ray Abdomen Flat And Erect (Final result)  Result time 08/17/20 04:51:34    Final result by Shannon Azevedo MD (08/17/20 04:51:34)                 Impression:      Dilated loops of small bowel with small bowel air-fluid levels concerning for small bowel obstruction.  Further evaluation with CT is advised.    This report was flagged in Epic as abnormal.      Electronically signed by: Shannon Azevedo MD  Date:    08/17/2020  Time:    04:51             Narrative:    EXAMINATION:  XR ABDOMEN FLAT AND ERECT    CLINICAL HISTORY:  Abdominal Pain;    TECHNIQUE:  Flat and erect AP views of the abdomen were performed.    COMPARISON:  08/02/2020    FINDINGS:  There are dilated loops of small bowel with associated small bowel air-fluid levels on the upright radiograph concerning for small bowel obstruction.  No definite evidence of free intraperitoneal air.  Visualized lung bases are grossly clear.  Osseous structures are intact.                                              Attending Attestation:         Attending Critical Care:   Critical Care Times:   Direct Patient Care (initial evaluation, reassessments, and time considering the case)................................................................10 minutes.   Additional History from reviewing old medical records or taking additional history from the family, EMS, PCP, etc.......................5 minutes.   Ordering, Reviewing, and Interpreting Diagnostic Studies...............................................................................................................5 minutes.    Documentation..................................................................................................................................................................................7 minutes.   Consultation with other Physicians. .................................................................................................................................................4 minutes.   ==============================================================  · Total Critical Care Time - exclusive of procedural time: 31 minutes.  ==============================================================  Critical care was necessary to treat or prevent imminent or life-threatening deterioration of the following conditions: sepsis and perforated viscous.   Critical care was time spent personally by me on the following activities: obtaining history from patient or relative, examination of patient, review of old charts, ordering lab, x-rays, and/or EKG, development of treatment plan with patient or relative, ordering and performing treatments and interventions, evaluation of patient's response to treatment, discussion with consultants, interpretation of cardiac measurements and re-evaluation of patient's conition.   Critical Care Condition: potentially life-threatening               ED Course as of Aug 17 0625   Mon Aug 17, 2020   0555 I called and discussed the patient's presentation and workup with Dr. Lamb who recommends admission to Internal Medicine and he will see the patient as a consultant.    [LP]   0623 Reviewed the patient's chart and discovered that he underwent robotic inguinal hernia repair with Dr. Andrea earlier this month. I called and discussed his current presentation and workup with Dr. Castellano and he is going to come and evaluate the patient.    [LP]      ED Course User Index  [LP] Geoffrey Bhatia III, MD                Clinical Impression:       ICD-10-CM ICD-9-CM   1. Small bowel obstruction  K56.609  560.9         Disposition:   Disposition: Admitted  Condition: Serious                        Geoffrey Bhatia III, MD  08/17/20 1934

## 2020-08-17 NOTE — TRANSFER OF CARE
"Anesthesia Transfer of Care Note    Patient: Navi Álvarez    Procedure(s) Performed: Procedure(s) (LRB):  LAPAROSCOPY, DIAGNOSTIC, possible laparotomy, other as indicated (N/A)  LYSIS, ADHESIONS (N/A)    Patient location: PACU    Anesthesia Type: general    Transport from OR: Transported from OR on 6-10 L/min O2 by face mask with adequate spontaneous ventilation    Post pain: adequate analgesia    Post assessment: no apparent anesthetic complications    Post vital signs: stable    Level of consciousness: awake, alert and oriented    Nausea/Vomiting: no nausea/vomiting    Complications: none    Transfer of care protocol was followed      Last vitals:   Visit Vitals  /61   Pulse 104   Temp 37.2 °C (99 °F) (Oral)   Resp 18   Ht 5' 3" (1.6 m)   Wt 65.8 kg (145 lb)   SpO2 (!) 94%   BMI 25.69 kg/m²     "

## 2020-08-17 NOTE — H&P
[facility]  INPATIENT H&P    REASON FOR CONSULT/ADMISSION: SBO s/p BIH (robotic)    HPI: Navi Álvarez is a 34 y.o. male s/p robotic BIH repair 2 weeks ago.  Had been doing well until sudden onset of pain, distension, and N/V last night. CT notes SBO with transition in RLQ.  Pt otherwise at baseline state of health.  He has felt better since NGT insertion.    I have reviewed the patient's chart including prior progress notes, procedures and testing.     ROS:   Review of Systems   Constitutional: Negative for activity change, chills, fatigue, fever and unexpected weight change.   HENT: Negative for facial swelling and trouble swallowing.    Eyes: Negative for visual disturbance.   Respiratory: Negative for cough, chest tightness, shortness of breath and wheezing.    Cardiovascular: Negative for chest pain, palpitations and leg swelling.   Gastrointestinal: Positive for abdominal distention, abdominal pain, nausea and vomiting. Negative for anal bleeding, blood in stool, constipation and diarrhea.   Endocrine: Negative for polydipsia and polyuria.   Genitourinary: Negative for dysuria and hematuria.   Musculoskeletal: Negative for arthralgias and myalgias.   Skin: Negative for color change, pallor and wound.   Allergic/Immunologic: Negative.    Neurological: Negative for syncope and weakness.   Hematological: Negative for adenopathy.   Psychiatric/Behavioral: Negative for agitation, behavioral problems and dysphoric mood.       PROBLEM LIST:  Patient Active Problem List   Diagnosis    Incarcerated inguinal hernia    SBO (small bowel obstruction)    Small bowel obstruction         HISTORY  Past Medical History:   Diagnosis Date    SBO (small bowel obstruction) 8/17/2020       Past Surgical History:   Procedure Laterality Date    LIPOMA RESECTION  8/3/2020    Procedure: EXCISION, LIPOMA;  Surgeon: Pancho Andrea MD;  Location: [facility] OR;  Service: General;;    ROBOT-ASSISTED LAPAROSCOPIC  REPAIR OF INGUINAL HERNIA Bilateral 8/3/2020    Procedure: ROBOTIC REPAIR, HERNIA, INGUINAL;  Surgeon: Pancho Andrea MD;  Location: [facility];  Service: General;  Laterality: Bilateral;       Social History     Tobacco Use    Smoking status: Never Smoker    Smokeless tobacco: Current User     Types: Chew   Substance Use Topics    Alcohol use: Yes     Alcohol/week: 4.0 - 6.0 standard drinks     Types: 4 - 6 Cans of beer per week     Comment: SOMETIMES     Drug use: Never       No family history on file.      MEDS:  No current facility-administered medications on file prior to encounter.      Current Outpatient Medications on File Prior to Encounter   Medication Sig Dispense Refill    HYDROcodone-acetaminophen (NORCO) 5-325 mg per tablet Take 1 tablet by mouth every 6 (six) hours as needed for Pain. 10 tablet 0       ALLERGIES:  Review of patient's allergies indicates:  No Known Allergies      VITALS:  Temp:  [99 °F (37.2 °C)] 99 °F (37.2 °C)  Pulse:  [] 98  Resp:  [18-22] 18  SpO2:  [95 %-99 %] 95 %  BP: (101-118)/(59-74) 109/61    I/O last 3 completed shifts:  In: 1100 [IV Piggyback:1100]  Out: -       PHYSICAL EXAM:  Physical Exam  Constitutional:       General: He is not in acute distress.     Appearance: He is well-developed.   HENT:      Head: Normocephalic and atraumatic.      Nose:      Comments: NGT in place  Eyes:      General: No scleral icterus.     Conjunctiva/sclera: Conjunctivae normal.      Pupils: Pupils are equal, round, and reactive to light.   Neck:      Musculoskeletal: Neck supple.   Cardiovascular:      Rate and Rhythm: Normal rate and regular rhythm.   Pulmonary:      Effort: Pulmonary effort is normal.      Breath sounds: Normal breath sounds.   Abdominal:      General: There is distension.      Palpations: There is no mass.      Tenderness: There is abdominal tenderness. There is no guarding or rebound.      Hernia: No hernia is present.      Comments: Incisions C/D/I;  Distended;  no peritonitis   Genitourinary:     Comments: Bilateral groin postop fluid collections  Musculoskeletal: Normal range of motion.   Skin:     General: Skin is warm and dry.      Findings: No rash.   Neurological:      Mental Status: He is alert and oriented to person, place, and time.   Psychiatric:         Behavior: Behavior normal.           LABS:  Lab Results   Component Value Date    WBC 16.65 (H) 08/17/2020    RBC 5.15 08/17/2020    HGB 16.9 08/17/2020    HCT 47.2 08/17/2020     (H) 08/17/2020     Lab Results   Component Value Date     (H) 08/17/2020     08/17/2020    K 2.9 (L) 08/17/2020     08/17/2020    CO2 15 (L) 08/17/2020    BUN 19 08/17/2020    CREATININE 0.8 08/17/2020    CALCIUM 7.1 (L) 08/17/2020     Lab Results   Component Value Date    ALT 19 08/17/2020    AST 12 08/17/2020    ALKPHOS 80 08/17/2020    BILITOT 0.4 08/17/2020     No results found for: MG, PHOS    STUDIES:  CT scan abd/pelvis and abdominal xray images and reports were personally reviewed.        ASSESSMENT & PLAN:  34 y.o. male with SBO s/p robotic BIH repair.  Suspect SB obstructed from barbed suture used to close peritoneal defect on the right.  I do not suspect pt has hernia recurrence.  Will plan for Dx Lap this morning.      Pancho Andrea M.D., F.A.C.S.  [facility] and General Surgery  [facility] [place] [facility] [place]

## 2020-08-17 NOTE — ED NOTES
Patient identifiers for Navi Álvarez checked and correct.  LOC: The patient is awake, alert and aware of environment the patient is oriented x 3.  APPEARANCE: Moaning. Patient uncomfortable and in no acute distress  SKIN: The skin is warm and dry, patient has normal skin turgor and moist mucus membranes, skin intact.  MUSKULOSKELETAL: Patient moving all extremities well, no obvious swelling or deformities noted.  RESPIRATORY: Airway is open and patent, respirations are spontaneous, patient has a normal effort and rate.  ABDOMEN: Tender to palpation.  Distension noted.

## 2020-08-17 NOTE — ED NOTES
Assumed care from LILA Pineda RN. Pt resting comfortably. NG tube to right nare to LIWS 250 ml yellow drainage noted to suction canister. No acute distress noted.

## 2020-08-17 NOTE — ED NOTES
Lab aware of blood specimens needing to be recollected. Flo in lab states she will send phlebotomist.

## 2020-08-17 NOTE — ED NOTES
Complaining of abdominal pain since 10:30 p.m. States he had hernia repair on the 3rd of Aug. Croatian speaking nurse at bedside.   109

## 2020-08-17 NOTE — ANESTHESIA PROCEDURE NOTES
Intubation  Performed by: Low Beverly MD  Authorized by: Seymour Villanueva MD     Intubation:     Induction:  Intravenous    Intubated:  Postinduction    Mask Ventilation:  Easy mask    Attempts:  1    Attempted By:  Resident anesthesiologist    Blade:  Zayas 3    Laryngeal View Grade: Grade I - full view of chords      Difficult Airway Encountered?: No      Complications:  None    Airway Device:  Oral endotracheal tube    Airway Device Size:  7.5    Style/Cuff Inflation:  Cuffed    Tube secured:  22    Secured at:  The lips    Placement Verified By:  Capnometry    Complicating Factors:  None    Findings Post-Intubation:  BS equal bilateral

## 2020-08-17 NOTE — ANESTHESIA POSTPROCEDURE EVALUATION
Anesthesia Post Evaluation    Patient: Navi Álvarez    Procedure(s) Performed: Procedure(s) (LRB):  LAPAROSCOPY, DIAGNOSTIC, possible laparotomy, other as indicated (N/A)  LYSIS, ADHESIONS (N/A)    Final Anesthesia Type: general    Patient location during evaluation: PACU  Patient participation: Yes- Able to Participate  Level of consciousness: awake and alert and oriented  Post-procedure vital signs: reviewed and stable  Pain management: adequate  Airway patency: patent    PONV status at discharge: No PONV  Anesthetic complications: no      Cardiovascular status: blood pressure returned to baseline and hemodynamically stable  Respiratory status: unassisted  Hydration status: euvolemic  Follow-up not needed.          Vitals Value Taken Time   /65 08/17/20 1419   Temp 36.7 °C (98.1 °F) 08/17/20 1309   Pulse 100 08/17/20 1423   Resp 17 08/17/20 1423   SpO2 98 % 08/17/20 1524   Vitals shown include unvalidated device data.      Event Time   Out of Recovery 14:27:36         Pain/Rosalinda Score: Pain Rating Prior to Med Admin: 6 (8/17/2020  2:01 PM)  Pain Rating Post Med Admin: 0 (8/17/2020  2:19 PM)  Rosalinda Score: 9 (8/17/2020  1:55 PM)

## 2020-08-17 NOTE — PLAN OF CARE
Patient has met PACU discharge criteria, VSS, pain well controlled. Family updated by phone. Released from PACU by Dr. Davies

## 2020-08-17 NOTE — PLAN OF CARE
Attempted to insert mello after induction.  Notified surgeon of no urine return.  Patient voided before rolling into OR.    Instructed by surgeon to abort mello insertion.

## 2020-08-18 VITALS
DIASTOLIC BLOOD PRESSURE: 54 MMHG | SYSTOLIC BLOOD PRESSURE: 96 MMHG | RESPIRATION RATE: 18 BRPM | HEIGHT: 63 IN | OXYGEN SATURATION: 98 % | BODY MASS INDEX: 23.43 KG/M2 | WEIGHT: 132.25 LBS | HEART RATE: 69 BPM | TEMPERATURE: 98 F

## 2020-08-18 LAB
ANION GAP SERPL CALC-SCNC: 8 MMOL/L (ref 8–16)
BASOPHILS # BLD AUTO: ABNORMAL K/UL (ref 0–0.2)
BASOPHILS NFR BLD: 0 % (ref 0–1.9)
BUN SERPL-MCNC: 10 MG/DL (ref 6–20)
CALCIUM SERPL-MCNC: 8.8 MG/DL (ref 8.7–10.5)
CHLORIDE SERPL-SCNC: 105 MMOL/L (ref 95–110)
CO2 SERPL-SCNC: 25 MMOL/L (ref 23–29)
CREAT SERPL-MCNC: 0.7 MG/DL (ref 0.5–1.4)
DIFFERENTIAL METHOD: ABNORMAL
EOSINOPHIL # BLD AUTO: ABNORMAL K/UL (ref 0–0.5)
EOSINOPHIL NFR BLD: 1 % (ref 0–8)
ERYTHROCYTE [DISTWIDTH] IN BLOOD BY AUTOMATED COUNT: 11.4 % (ref 11.5–14.5)
EST. GFR  (AFRICAN AMERICAN): >60 ML/MIN/1.73 M^2
EST. GFR  (NON AFRICAN AMERICAN): >60 ML/MIN/1.73 M^2
GLUCOSE SERPL-MCNC: 181 MG/DL (ref 70–110)
HCT VFR BLD AUTO: 38.5 % (ref 40–54)
HGB BLD-MCNC: 13 G/DL (ref 14–18)
IMM GRANULOCYTES # BLD AUTO: ABNORMAL K/UL (ref 0–0.04)
IMM GRANULOCYTES NFR BLD AUTO: ABNORMAL % (ref 0–0.5)
LYMPHOCYTES # BLD AUTO: ABNORMAL K/UL (ref 1–4.8)
LYMPHOCYTES NFR BLD: 17 % (ref 18–48)
MAGNESIUM SERPL-MCNC: 1.9 MG/DL (ref 1.6–2.6)
MCH RBC QN AUTO: 32.2 PG (ref 27–31)
MCHC RBC AUTO-ENTMCNC: 33.8 G/DL (ref 32–36)
MCV RBC AUTO: 95 FL (ref 82–98)
MONOCYTES # BLD AUTO: ABNORMAL K/UL (ref 0.3–1)
MONOCYTES NFR BLD: 3 % (ref 4–15)
NEUTROPHILS NFR BLD: 59 % (ref 38–73)
NEUTS BAND NFR BLD MANUAL: 20 %
NRBC BLD-RTO: 0 /100 WBC
PHOSPHATE SERPL-MCNC: 3.1 MG/DL (ref 2.7–4.5)
PLATELET # BLD AUTO: 247 K/UL (ref 150–350)
PLATELET BLD QL SMEAR: ABNORMAL
PMV BLD AUTO: 10.1 FL (ref 9.2–12.9)
POTASSIUM SERPL-SCNC: 3.6 MMOL/L (ref 3.5–5.1)
RBC # BLD AUTO: 4.04 M/UL (ref 4.6–6.2)
SODIUM SERPL-SCNC: 138 MMOL/L (ref 136–145)
WBC # BLD AUTO: 6.08 K/UL (ref 3.9–12.7)

## 2020-08-18 PROCEDURE — 25000003 PHARM REV CODE 250: Performed by: SURGERY

## 2020-08-18 PROCEDURE — 84100 ASSAY OF PHOSPHORUS: CPT

## 2020-08-18 PROCEDURE — 80048 BASIC METABOLIC PNL TOTAL CA: CPT

## 2020-08-18 PROCEDURE — 36415 COLL VENOUS BLD VENIPUNCTURE: CPT

## 2020-08-18 PROCEDURE — 63600175 PHARM REV CODE 636 W HCPCS: Performed by: SURGERY

## 2020-08-18 PROCEDURE — 85027 COMPLETE CBC AUTOMATED: CPT

## 2020-08-18 PROCEDURE — 83735 ASSAY OF MAGNESIUM: CPT

## 2020-08-18 PROCEDURE — 85007 BL SMEAR W/DIFF WBC COUNT: CPT

## 2020-08-18 PROCEDURE — 94761 N-INVAS EAR/PLS OXIMETRY MLT: CPT

## 2020-08-18 RX ORDER — OXYCODONE AND ACETAMINOPHEN 5; 325 MG/1; MG/1
1 TABLET ORAL EVERY 6 HOURS PRN
Qty: 12 TABLET | Refills: 0 | Status: SHIPPED | OUTPATIENT
Start: 2020-08-18 | End: 2020-08-25

## 2020-08-18 RX ORDER — OXYCODONE AND ACETAMINOPHEN 5; 325 MG/1; MG/1
1 TABLET ORAL EVERY 4 HOURS PRN
Status: DISCONTINUED | OUTPATIENT
Start: 2020-08-18 | End: 2020-08-18 | Stop reason: HOSPADM

## 2020-08-18 RX ADMIN — SODIUM CHLORIDE, SODIUM LACTATE, POTASSIUM CHLORIDE, AND CALCIUM CHLORIDE: .6; .31; .03; .02 INJECTION, SOLUTION INTRAVENOUS at 06:08

## 2020-08-18 RX ADMIN — ONDANSETRON 8 MG: 8 TABLET, ORALLY DISINTEGRATING ORAL at 06:08

## 2020-08-18 NOTE — PLAN OF CARE
TN used language line to conduct assessment   Ben  #[id number]      08/18/20 1709   Readmission Questionnaire   At the time of your discharge, did someone talk to you about what your health problems were? Yes  (interview conducted with Taty Benavides - Ben #[id number])   At the time of discharge, did someone talk to you about what to watch out for regarding worsening of your health problem? Yes   At the time of discharge, did someone talk to you about what to do if you experienced worsening of your health problem? Yes   At the time of discharge, did someone talk to you about when and where to follow up with a doctor after you left the hospital? Yes   What do you believe caused you to be sick enough to be re-admitted? I started having stomach pain   Do you have problems taking your medications as prescribed? No   Do you have any problems affording any of  your prescribed medications? No   Do you have problems obtaining/receiving your medications? No   Does the patient have transportation to healthcare appointments? Yes   Lives With spouse   Living Arrangements apartment   Does the patient have family/friends to help with healtcare needs after discharge? yes   Who are your caregiver(s) and their phone number(s)? wife Pepper Delcid  [phone]   Does your caregiver provide all the help you need? Yes   Are you currently feeling confused? No   Are you currently having problems thinking? No   Are you currently having memory problems? No   Have you felt down, depressed, or hopeless? 0   Have you felt little interest or pleasure in doing things? 0

## 2020-08-18 NOTE — PLAN OF CARE
Pt AAOx4, complains of very minimal pain and nausea but denies emesis and SOB. Pt remains NPO except some ice chips, IVF infusing per MAR to PIV. NGT to LIWS with a small amount of thin green output noted. [employer] video  used throughout shift. Pt ambulating with standby assistance and voiding without difficulties. Safety maintained, bed alarm on - will cont to monitor.

## 2020-08-18 NOTE — NURSING
Pt being discharged home in stable condition. AVS packet given to pt. Reviewed discharged instructions with pt. Pt verbalized understanding using the teach back technique.

## 2020-08-18 NOTE — PROGRESS NOTES
Patient tolerated lunch and dinner without issue.  Has had continued bowel function.  Denies pain.  I will discharge him home.  He will follow up with me on September 1st in clinic here.

## 2020-08-18 NOTE — PLAN OF CARE
TN met with pt   pt at [facility]  8/2-4/20 - underwent Robotic Bilat Inguinal Hernia Repair   now with pain, distention, n/v     8/17 underwent Dx. Lap with KEITH for SBO       TN used services of [employer]  Ben  #[id number]     spouse Pepper Delcid      pt has transportation to home -- friend will pick pt up    TN sent Team message to Johana Gusman asking if pt is eligible for [facility].     MD rounded while TN was present with [employer]   -- pt for d/c to home this pm          08/18/20 1701   Discharge Assessment   Assessment Type Discharge Planning Assessment  (TN used [employer] - Ben #[id number])   Confirmed/corrected address and phone number on facesheet? Yes   Assessment information obtained from? Patient;Medical Record   Expected Length of Stay (days) 1   Communicated expected length of stay with patient/caregiver yes   Prior to hospitilization cognitive status: Alert/Oriented   Prior to hospitalization functional status: Independent   Current cognitive status: Alert/Oriented   Current Functional Status: Independent   Lives With spouse   Able to Return to Prior Arrangements yes   Is patient able to care for self after discharge? Yes   Who are your caregiver(s) and their phone number(s)? wife Pepper Delcid  [phone]   Patient's perception of discharge disposition home or selfcare   Patient currently being followed by outpatient case management? No   Patient currently receives any other outside agency services? No   Equipment Currently Used at Home none   Do you have any problems affording any of your prescribed medications? No   Is the patient taking medications as prescribed? yes   Does the patient have transportation home? Yes   Transportation Anticipated family or friend will provide   Does the patient receive services at the Coumadin Clinic? No   Discharge Plan A Home with family;Home   DME Needed Upon Discharge  none   Patient/Family in Agreement with Plan yes

## 2020-08-18 NOTE — PROGRESS NOTES
Progress Note  General Surgery    Admit Date: 8/17/2020  S/P: Procedure(s) (LRB):  LAPAROSCOPY, DIAGNOSTIC, possible laparotomy, other as indicated (N/A)  LYSIS, ADHESIONS (N/A)    Post-operative Day: 1 Day Post-Op    Hospital Day: 2    SUBJECTIVE:     No acute events overnight. Patient states pain is feeling better. Bowel function present.  Denies Fever, chills, nausea, vomiting.     OBJECTIVE:     Vital Signs (Most Recent)  Temp: 98.5 °F (36.9 °C) (08/18/20 0734)  Pulse: 80 (08/18/20 0734)  Resp: 18 (08/18/20 0734)  BP: (!) 100/57 (08/18/20 0734)  SpO2: 97 % (08/18/20 0833)    Vital Signs Range (Last 24H):  Temp:  [97.8 °F (36.6 °C)-98.7 °F (37.1 °C)]   Pulse:  []   Resp:  [15-21]   BP: (100-126)/(55-76)   SpO2:  [94 %-100 %]     I & O (Last 24H):    Intake/Output Summary (Last 24 hours) at 8/18/2020 0913  Last data filed at 8/18/2020 0610  Gross per 24 hour   Intake 1647.92 ml   Output 750 ml   Net 897.92 ml       Physical Exam:  Gen: NAD   HEENT: NCAT  Pulm: unlabored, symmetrical   Abd: Soft, Margie. TTP. No rebound, guarding.       Wound/Incision:  clean, dry, intact    Laboratory:  Labs within the past 24 hours have been reviewed.    ASSESSMENT/PLAN:     Assessment: Doing well.    Plan:  D/C NGT, ok to start diet    Pancho Andrea M.D., F.A.C.S.  [facility] and General Surgery  [facility] [place] [facility] [place]

## 2020-08-18 NOTE — PLAN OF CARE
Angolan speaking VN cued into room to complete admit assessment, VIP model introduced, VN working alongside bedside treatment team.  Plan of care reviewed with patient. Patient verbalized understanding. Patient informed of fall risk and fall precautions, call light within reach, 2x bed rails. Patient notified to ask staff for assistance and pt verbalized complete understanding. Time allowed for questions. Will continue to monitor and intervene as needed.

## 2020-08-18 NOTE — NURSING
Discharge instructions reviewed with pt. Edu and teach back done on all medications including name of medication, frequency,side effects, dose, and time for taking med. IV d/c'd. personal belongings taken from room with pt including discharge instruction and prescriptions. safety maintained and pt wheeled down stairs.

## 2020-08-19 NOTE — DISCHARGE SUMMARY
[facility]  Discharge Summary  General Surgery      Admit Date: 8/17/2020    Discharge Date: 8/18/2020    Attending Physician: Pancho Andrea M.D., DEBBIE.    Discharge Provider: Pancho Andrea M.D., DEBBIE.    Reason for Admission:  Small bowel obstruction    Procedures Performed: Procedure(s) (LRB):  LAPAROSCOPY, DIAGNOSTIC, possible laparotomy, other as indicated (N/A)  LYSIS, ADHESIONS (N/A)    Hospital Course:  Patient is a 34-year old  male male with Hx of robotic bilateral inguinal hernia repair 2 weeks prior to admission.  He presented with a small-bowel obstruction.  He was taken to surgery for a diagnostic laparoscopy and lysis of adhesions.  This relieved the obstruction and he was able to recover uneventfully.  Patient was discharged home in a stable condition and tolerating a regular diet.    Consults: None    Significant Diagnostic Studies:  CT scan the abdomen pelvis    Final Diagnoses:   Principal Problem: SBO (small bowel obstruction)       Discharged Condition: Good    Disposition: Home or Self Care    Follow Up/Patient Instructions: Follow up with Dr. Andrea in 2 weeks.    Medications:  Reconciled Home Medications:      Medication List      START taking these medications    oxyCODONE-acetaminophen 5-325 mg per tablet  Commonly known as: PERCOCET  [place] nishant tableta por vía oral cada 6 horas según sea necesario para el dolor.  (Take 1 tablet by mouth every 6 (six) hours as needed for Pain.)        STOP taking these medications    HYDROcodone-acetaminophen 5-325 mg per tablet  Commonly known as: NORCO          Discharge Procedure Orders   Diet Adult Regular     Lifting restrictions   Order Comments: No lifting greater than 20 lb x2 weeks.     Notify your health care provider if you experience any of the following:  temperature >100.4     Notify your health care provider if you experience any of the following:     Notify your health care provider if you experience any of the  following:  increased confusion or weakness     Notify your health care provider if you experience any of the following:  persistent dizziness, light-headedness, or visual disturbances     Notify your health care provider if you experience any of the following:  worsening rash     Notify your health care provider if you experience any of the following:  severe persistent headache     Notify your health care provider if you experience any of the following:  difficulty breathing or increased cough     Notify your health care provider if you experience any of the following:  redness, tenderness, or signs of infection (pain, swelling, redness, odor or green/yellow discharge around incision site)     Notify your health care provider if you experience any of the following:  severe uncontrolled pain     Notify your health care provider if you experience any of the following:  persistent nausea and vomiting or diarrhea     Remove dressing in 24 hours   Order Comments: Okay to shower after dressing removal.  No bathing or swimming x2 weeks.     Follow-up Information     Pancho Andrea MD On 9/1/2020.    Specialty: General Surgery  Why: 2:20 pm     Contact information:  [address]  [address] [place] [id number]  [phone]                   Activity: As tolerated.  Diet:  Regular  Wound Care: As above.      Pancho Andrea M.D., F.A.C.S.  Bgvckj-Cplrdxfkf-[facility] and General Surgery  [facility] [place]

## 2020-08-22 LAB
BACTERIA BLD CULT: NORMAL
BACTERIA BLD CULT: NORMAL

## 2020-09-01 ENCOUNTER — OFFICE VISIT (OUTPATIENT)
Dept: SURGERY | Facility: CLINIC | Age: 35
End: 2020-09-01

## 2020-09-01 VITALS
BODY MASS INDEX: 23.43 KG/M2 | HEIGHT: 63 IN | HEART RATE: 92 BPM | DIASTOLIC BLOOD PRESSURE: 73 MMHG | WEIGHT: 132.25 LBS | SYSTOLIC BLOOD PRESSURE: 116 MMHG

## 2020-09-01 DIAGNOSIS — K40.30 INCARCERATED INGUINAL HERNIA: Primary | ICD-10-CM

## 2020-09-01 PROCEDURE — 99024 POSTOP FOLLOW-UP VISIT: CPT | Mod: ,,, | Performed by: SURGERY

## 2020-09-01 PROCEDURE — 99999 PR PBB SHADOW E&M-EST. PATIENT-LVL II: CPT | Mod: PBBFAC,,, | Performed by: SURGERY

## 2020-09-01 PROCEDURE — 99212 OFFICE O/P EST SF 10 MIN: CPT | Mod: PBBFAC,PO | Performed by: SURGERY

## 2020-09-01 PROCEDURE — 99999 PR PBB SHADOW E&M-EST. PATIENT-LVL II: ICD-10-PCS | Mod: PBBFAC,,, | Performed by: SURGERY

## 2020-09-01 PROCEDURE — 99024 PR POST-OP FOLLOW-UP VISIT: ICD-10-PCS | Mod: ,,, | Performed by: SURGERY

## 2020-09-01 NOTE — PROGRESS NOTES
[facility]  POST-OP NOTE    HPI: Navi Álvarez is a 34 y.o. male s/p Robotic IH repair for incarcerated hernia/SBO and subsequent take for recurrent SBO.  Doing well.  No complaints.      VITALS:  Vitals:    09/01/20 1502   BP: 116/73   Pulse: 92       PHYSICAL EXAM:  GEN: NAD  HEENT: NCAT  ABD: incisions C/D/I; small post-op fluid collection in right groin    PATHOLOGY:  Reviewed      ASSESSMENT & PLAN:  RTC in 2 months      Pancho Andrea M.D., F.A.C.S.  Grxrgi-Vydswojjg-Ybbhqch and General Surgery  [facility] [place]

## 2022-08-31 ENCOUNTER — HOSPITAL ENCOUNTER (EMERGENCY)
Facility: HOSPITAL | Age: 37
Discharge: HOME | End: 2022-08-31
Attending: EMERGENCY MEDICINE

## 2022-08-31 VITALS
DIASTOLIC BLOOD PRESSURE: 88 MMHG | OXYGEN SATURATION: 96 % | SYSTOLIC BLOOD PRESSURE: 148 MMHG | RESPIRATION RATE: 17 BRPM | TEMPERATURE: 99 F | WEIGHT: 138.75 LBS | BODY MASS INDEX: 24.58 KG/M2 | HEART RATE: 105 BPM

## 2022-08-31 DIAGNOSIS — M79.641 RIGHT HAND PAIN: Primary | ICD-10-CM

## 2022-08-31 DIAGNOSIS — L03.90 CELLULITIS, UNSPECIFIED CELLULITIS SITE: ICD-10-CM

## 2022-08-31 PROCEDURE — 25000003 PHARM REV CODE 250: Performed by: PHYSICIAN ASSISTANT

## 2022-08-31 PROCEDURE — 99284 EMERGENCY DEPT VISIT MOD MDM: CPT

## 2022-08-31 RX ORDER — IBUPROFEN 600 MG/1
600 TABLET ORAL EVERY 6 HOURS PRN
Qty: 30 TABLET | Refills: 0 | Status: SHIPPED | OUTPATIENT
Start: 2022-08-31

## 2022-08-31 RX ORDER — IBUPROFEN 600 MG/1
600 TABLET ORAL
Status: COMPLETED | OUTPATIENT
Start: 2022-08-31 | End: 2022-08-31

## 2022-08-31 RX ORDER — CLINDAMYCIN HYDROCHLORIDE 300 MG/1
300 CAPSULE ORAL EVERY 8 HOURS
Qty: 21 CAPSULE | Refills: 0 | Status: SHIPPED | OUTPATIENT
Start: 2022-08-31 | End: 2022-09-07

## 2022-08-31 RX ADMIN — IBUPROFEN 600 MG: 600 TABLET, FILM COATED ORAL at 09:08

## 2022-09-01 NOTE — DISCHARGE INSTRUCTIONS
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
## 2022-09-01 NOTE — ED TRIAGE NOTES
Patient identifiers verified and correct.     APPEARANCE: Patient not in acute distress.  NEURO: Awake, alert, appropriate for age, condition, and situation, pupils equal, round, and reactive.   HEENT: Head symmetrical. Eyes bilateral.  Bilateral ears without drainage. Bilateral nares patent, throat clear.  CARDIAC: Regular rate and rhythm  RESPIRATORY: Airway is open and patent. Respirations are normal and spontaneous on room air.   NEUROVASCULAR: All extremities are warm and pink. .  MUSCULOSKELETAL: Moves all extremities.   SKIN: Warm and dry, adequate turgor, mucus membranes moist and pink; redness and swelling to right thumb.   Will continue to monitor.

## 2022-09-07 ENCOUNTER — HOSPITAL ENCOUNTER (EMERGENCY)
Facility: HOSPITAL | Age: 37
Discharge: HOME | End: 2022-09-07
Attending: STUDENT IN AN ORGANIZED HEALTH CARE EDUCATION/TRAINING PROGRAM

## 2022-09-07 VITALS
SYSTOLIC BLOOD PRESSURE: 118 MMHG | OXYGEN SATURATION: 97 % | RESPIRATION RATE: 18 BRPM | DIASTOLIC BLOOD PRESSURE: 71 MMHG | HEART RATE: 104 BPM | TEMPERATURE: 99 F

## 2022-09-07 DIAGNOSIS — M79.641 RIGHT HAND PAIN: ICD-10-CM

## 2022-09-07 DIAGNOSIS — E11.9 DIABETES MELLITUS, NEW ONSET: Primary | ICD-10-CM

## 2022-09-07 DIAGNOSIS — L03.011 CELLULITIS OF FINGER OF RIGHT HAND: ICD-10-CM

## 2022-09-07 LAB
ALBUMIN SERPL BCP-MCNC: 3.9 G/DL (ref 3.5–5.2)
ALP SERPL-CCNC: 113 U/L (ref 55–135)
ALT SERPL W/O P-5'-P-CCNC: 41 U/L (ref 10–44)
ANION GAP SERPL CALC-SCNC: 18 MMOL/L (ref 8–16)
AST SERPL-CCNC: 23 U/L (ref 10–40)
B-OH-BUTYR BLD STRIP-SCNC: 0.1 MMOL/L (ref 0–0.5)
BASOPHILS # BLD AUTO: 0.03 K/UL (ref 0–0.2)
BASOPHILS NFR BLD: 0.5 % (ref 0–1.9)
BILIRUB SERPL-MCNC: 0.3 MG/DL (ref 0.1–1)
BUN SERPL-MCNC: 7 MG/DL (ref 6–20)
CALCIUM SERPL-MCNC: 9.3 MG/DL (ref 8.7–10.5)
CHLORIDE SERPL-SCNC: 101 MMOL/L (ref 95–110)
CO2 SERPL-SCNC: 20 MMOL/L (ref 23–29)
CREAT SERPL-MCNC: 0.8 MG/DL (ref 0.5–1.4)
CRP SERPL-MCNC: 3 MG/L (ref 0–8.2)
DIFFERENTIAL METHOD: ABNORMAL
EOSINOPHIL # BLD AUTO: 0 K/UL (ref 0–0.5)
EOSINOPHIL NFR BLD: 0.3 % (ref 0–8)
ERYTHROCYTE [DISTWIDTH] IN BLOOD BY AUTOMATED COUNT: 11.5 % (ref 11.5–14.5)
EST. GFR  (NO RACE VARIABLE): >60 ML/MIN/1.73 M^2
GLUCOSE SERPL-MCNC: 429 MG/DL (ref 70–110)
HCT VFR BLD AUTO: 43.5 % (ref 40–54)
HGB BLD-MCNC: 15.3 G/DL (ref 14–18)
IMM GRANULOCYTES # BLD AUTO: 0.01 K/UL (ref 0–0.04)
IMM GRANULOCYTES NFR BLD AUTO: 0.2 % (ref 0–0.5)
LYMPHOCYTES # BLD AUTO: 2.7 K/UL (ref 1–4.8)
LYMPHOCYTES NFR BLD: 44.6 % (ref 18–48)
MCH RBC QN AUTO: 34.2 PG (ref 27–31)
MCHC RBC AUTO-ENTMCNC: 35.2 G/DL (ref 32–36)
MCV RBC AUTO: 97 FL (ref 82–98)
MONOCYTES # BLD AUTO: 0.4 K/UL (ref 0.3–1)
MONOCYTES NFR BLD: 6.1 % (ref 4–15)
NEUTROPHILS # BLD AUTO: 2.9 K/UL (ref 1.8–7.7)
NEUTROPHILS NFR BLD: 48.3 % (ref 38–73)
NRBC BLD-RTO: 0 /100 WBC
PLATELET # BLD AUTO: 225 K/UL (ref 150–450)
PMV BLD AUTO: 10.2 FL (ref 9.2–12.9)
POCT GLUCOSE: 389 MG/DL (ref 70–110)
POTASSIUM SERPL-SCNC: 3.6 MMOL/L (ref 3.5–5.1)
PROT SERPL-MCNC: 7.9 G/DL (ref 6–8.4)
RBC # BLD AUTO: 4.47 M/UL (ref 4.6–6.2)
SODIUM SERPL-SCNC: 139 MMOL/L (ref 136–145)
WBC # BLD AUTO: 6.07 K/UL (ref 3.9–12.7)

## 2022-09-07 PROCEDURE — 82962 GLUCOSE BLOOD TEST: CPT

## 2022-09-07 PROCEDURE — 99284 EMERGENCY DEPT VISIT MOD MDM: CPT | Mod: 25

## 2022-09-07 PROCEDURE — 96360 HYDRATION IV INFUSION INIT: CPT

## 2022-09-07 PROCEDURE — 86140 C-REACTIVE PROTEIN: CPT | Performed by: NURSE PRACTITIONER

## 2022-09-07 PROCEDURE — 25000003 PHARM REV CODE 250: Performed by: STUDENT IN AN ORGANIZED HEALTH CARE EDUCATION/TRAINING PROGRAM

## 2022-09-07 PROCEDURE — 80053 COMPREHEN METABOLIC PANEL: CPT | Performed by: NURSE PRACTITIONER

## 2022-09-07 PROCEDURE — 85025 COMPLETE CBC W/AUTO DIFF WBC: CPT | Performed by: NURSE PRACTITIONER

## 2022-09-07 PROCEDURE — 25000003 PHARM REV CODE 250: Performed by: NURSE PRACTITIONER

## 2022-09-07 PROCEDURE — 82010 KETONE BODYS QUAN: CPT | Performed by: NURSE PRACTITIONER

## 2022-09-07 RX ORDER — CEPHALEXIN 500 MG/1
1000 CAPSULE ORAL
Status: COMPLETED | OUTPATIENT
Start: 2022-09-07 | End: 2022-09-07

## 2022-09-07 RX ORDER — METFORMIN HYDROCHLORIDE 500 MG/1
1000 TABLET ORAL ONCE
Status: COMPLETED | OUTPATIENT
Start: 2022-09-07 | End: 2022-09-07

## 2022-09-07 RX ORDER — CEPHALEXIN 500 MG/1
1000 CAPSULE ORAL EVERY 12 HOURS
Qty: 20 CAPSULE | Refills: 0 | Status: SHIPPED | OUTPATIENT
Start: 2022-09-07 | End: 2022-09-14

## 2022-09-07 RX ORDER — METFORMIN HYDROCHLORIDE 500 MG/1
500 TABLET ORAL 2 TIMES DAILY WITH MEALS
Qty: 30 TABLET | Refills: 0 | Status: SHIPPED | OUTPATIENT
Start: 2022-09-07 | End: 2023-01-08 | Stop reason: SDUPTHER

## 2022-09-07 RX ADMIN — METFORMIN HYDROCHLORIDE 1000 MG: 500 TABLET ORAL at 10:09

## 2022-09-07 RX ADMIN — SODIUM CHLORIDE 1000 ML: 0.9 INJECTION, SOLUTION INTRAVENOUS at 09:09

## 2022-09-07 RX ADMIN — CEPHALEXIN 1000 MG: 500 CAPSULE ORAL at 10:09

## 2022-09-07 NOTE — Clinical Note
"Navi Bush" Tramaine Álvarez was seen and treated in our emergency department on 9/7/2022.  He may return to work on 09/12/2022.       If you have any questions or concerns, please don't hesitate to call.      Pablo Avery MD"
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
## 2022-09-08 NOTE — ED TRIAGE NOTES
Pt reports to ED with a complaint of pain and redness to right hand.       Patient identifiers verified and correct.     APPEARANCE: Patient not in acute distress.  NEURO: Awake, alert, appropriate for age, condition, and situation, pupils equal, round, and reactive.   HEENT: Head symmetrical. Eyes bilateral.  Bilateral ears without drainage. Bilateral nares patent, throat clear.  CARDIAC: Regular rate and rhythm  RESPIRATORY: Airway is open and patent. Respirations are normal and spontaneous on room air.   GI/: Abdomen soft and non-distended.   NEUROVASCULAR: All extremities are warm and pink. .  MUSCULOSKELETAL: Moves all extremities.   SKIN: Warm and dry, adequate turgor, mucus membranes moist and pink; no breakdown, lesions, or ecchymosis noted.     Will continue to monitor.

## 2022-09-08 NOTE — ED PROVIDER NOTES
Patient signed out to me at the change of shift. I refer you to the prior provider's history and physical examination for comprehensive evaluation. Remainder of my involvement in this patient's case will be followed by the indication [BG].    ED Course as of 09/07/22 2242   Wed Sep 07, 2022   2059 Pt will be followed per Dr Avery [DT]   2240 Beta hydroxy negative. IVF completed.  [BG]   2241 On reassessment, the patient is resting comfortably.  At a very long urging the patient's as well as his diabetes.  This is 1st diagnosis of diabetes.  With regards to his finger, the right 5th due to swollen and tender to palpation.  Full range of motion of it or has a congenital abnormality that limits his motion.  He says it has been like that since he was young however the swelling is worse.  He is afebrile and given the limitations of the exam I do not suspect septic joint.  Will initiate Keflex for him and with given strict return precautions.      The patient also has been diagnosed diabetes.  He reports that he is a chronic alcoholic and drinks many sugary drinks all the time.  He does not wish to stay in the hospital and therefore I will be starting him on metformin.  Very clear precautions of had a take this medicine and what to look out for.  Very long discussion regarding management for both of these and strict return precautions to return to the emergency department. [BG]      ED Course User Index  [BG] Pablo Avery MD  [DT] ISHMAEL Obrien MD  09/07/22 2242

## 2022-09-08 NOTE — DISCHARGE INSTRUCTIONS
Take Keflex and metformin as prescribed.  Establish care with a primary care physician.  Please monitor your symptoms and watch for redness, warmth, fevers, increased pain and return to the emergency department immediately.

## 2022-09-12 ENCOUNTER — HOSPITAL ENCOUNTER (EMERGENCY)
Facility: HOSPITAL | Age: 37
Discharge: HOME | End: 2022-09-12
Attending: EMERGENCY MEDICINE

## 2022-09-12 VITALS
RESPIRATION RATE: 20 BRPM | TEMPERATURE: 98 F | OXYGEN SATURATION: 98 % | SYSTOLIC BLOOD PRESSURE: 105 MMHG | DIASTOLIC BLOOD PRESSURE: 67 MMHG | HEART RATE: 82 BPM

## 2022-09-12 DIAGNOSIS — E11.65 TYPE 2 DIABETES MELLITUS WITH HYPERGLYCEMIA, WITHOUT LONG-TERM CURRENT USE OF INSULIN: Primary | ICD-10-CM

## 2022-09-12 DIAGNOSIS — R53.1 WEAK: ICD-10-CM

## 2022-09-12 DIAGNOSIS — M79.641 RIGHT HAND PAIN: ICD-10-CM

## 2022-09-12 LAB
ALBUMIN SERPL BCP-MCNC: 4.2 G/DL (ref 3.5–5.2)
ALP SERPL-CCNC: 110 U/L (ref 55–135)
ALT SERPL W/O P-5'-P-CCNC: 52 U/L (ref 10–44)
ANION GAP SERPL CALC-SCNC: 13 MMOL/L (ref 8–16)
AST SERPL-CCNC: 31 U/L (ref 10–40)
BASOPHILS # BLD AUTO: 0.04 K/UL (ref 0–0.2)
BASOPHILS NFR BLD: 0.9 % (ref 0–1.9)
BILIRUB SERPL-MCNC: 0.7 MG/DL (ref 0.1–1)
BUN SERPL-MCNC: 12 MG/DL (ref 6–20)
CALCIUM SERPL-MCNC: 9.4 MG/DL (ref 8.7–10.5)
CHLORIDE SERPL-SCNC: 99 MMOL/L (ref 95–110)
CO2 SERPL-SCNC: 23 MMOL/L (ref 23–29)
CREAT SERPL-MCNC: 0.8 MG/DL (ref 0.5–1.4)
DIFFERENTIAL METHOD: ABNORMAL
EOSINOPHIL # BLD AUTO: 0.1 K/UL (ref 0–0.5)
EOSINOPHIL NFR BLD: 2.4 % (ref 0–8)
ERYTHROCYTE [DISTWIDTH] IN BLOOD BY AUTOMATED COUNT: 10.9 % (ref 11.5–14.5)
EST. GFR  (NO RACE VARIABLE): >60 ML/MIN/1.73 M^2
GLUCOSE SERPL-MCNC: 235 MG/DL (ref 70–110)
HCT VFR BLD AUTO: 46.4 % (ref 40–54)
HGB BLD-MCNC: 16.4 G/DL (ref 14–18)
IMM GRANULOCYTES # BLD AUTO: 0.01 K/UL (ref 0–0.04)
IMM GRANULOCYTES NFR BLD AUTO: 0.2 % (ref 0–0.5)
LYMPHOCYTES # BLD AUTO: 2 K/UL (ref 1–4.8)
LYMPHOCYTES NFR BLD: 41.8 % (ref 18–48)
MAGNESIUM SERPL-MCNC: 1.9 MG/DL (ref 1.6–2.6)
MCH RBC QN AUTO: 34.4 PG (ref 27–31)
MCHC RBC AUTO-ENTMCNC: 35.3 G/DL (ref 32–36)
MCV RBC AUTO: 97 FL (ref 82–98)
MONOCYTES # BLD AUTO: 0.3 K/UL (ref 0.3–1)
MONOCYTES NFR BLD: 6.9 % (ref 4–15)
NEUTROPHILS # BLD AUTO: 2.2 K/UL (ref 1.8–7.7)
NEUTROPHILS NFR BLD: 47.8 % (ref 38–73)
NRBC BLD-RTO: 0 /100 WBC
PLATELET # BLD AUTO: 225 K/UL (ref 150–450)
PMV BLD AUTO: 10.2 FL (ref 9.2–12.9)
POTASSIUM SERPL-SCNC: 4.2 MMOL/L (ref 3.5–5.1)
PROT SERPL-MCNC: 8 G/DL (ref 6–8.4)
RBC # BLD AUTO: 4.77 M/UL (ref 4.6–6.2)
SODIUM SERPL-SCNC: 135 MMOL/L (ref 136–145)
WBC # BLD AUTO: 4.67 K/UL (ref 3.9–12.7)

## 2022-09-12 PROCEDURE — 93005 ELECTROCARDIOGRAM TRACING: CPT

## 2022-09-12 PROCEDURE — 80053 COMPREHEN METABOLIC PANEL: CPT | Performed by: EMERGENCY MEDICINE

## 2022-09-12 PROCEDURE — 93010 EKG 12-LEAD: ICD-10-PCS | Mod: ,,, | Performed by: INTERNAL MEDICINE

## 2022-09-12 PROCEDURE — 83735 ASSAY OF MAGNESIUM: CPT | Performed by: EMERGENCY MEDICINE

## 2022-09-12 PROCEDURE — 99284 EMERGENCY DEPT VISIT MOD MDM: CPT | Mod: 25

## 2022-09-12 PROCEDURE — 85025 COMPLETE CBC W/AUTO DIFF WBC: CPT | Performed by: EMERGENCY MEDICINE

## 2022-09-12 PROCEDURE — 93010 ELECTROCARDIOGRAM REPORT: CPT | Mod: ,,, | Performed by: INTERNAL MEDICINE

## 2022-09-12 RX ORDER — INSULIN PUMP SYRINGE, 3 ML
EACH MISCELLANEOUS
Qty: 1 EACH | Refills: 0 | Status: SHIPPED | OUTPATIENT
Start: 2022-09-12 | End: 2022-09-12 | Stop reason: SDUPTHER

## 2022-09-12 RX ORDER — DEXTROSE 4 G
TABLET,CHEWABLE ORAL
Qty: 1 EACH | Refills: 0 | Status: SHIPPED | OUTPATIENT
Start: 2022-09-12

## 2022-09-12 NOTE — ED NOTES
Pt in room 6 from [facility]. Pt seen here last week for right hand pain and swelling. Pt presents today for more information on newly diagnosed diabetes. Head to toe assessment completed,plan of care reviewed, call bell within reach.

## 2022-09-12 NOTE — PLAN OF CARE
SW notified of pt's new diagnosis and need for diabetic education. ALFREDO requested Donny Danish-speaking appointment with Zuhair Silver.        09/12/22 2646   Post-Acute Status   Post-Acute Authorization Other   Other Status Awaiting f/u Appts   Hospital Resources/Appts/Education Provided Appointments scheduled by Navigator/Coordinator;Community resources provided     Ariana Morris LMSW  ED Social Work  [phone]

## 2022-09-12 NOTE — ED NOTES
Pt given discharge and follow up instructions by Honduran speaking RN. Pt verbalized understanding. Pt ambulated out of ER with steady gait in stable condition.

## 2022-09-12 NOTE — ED PROVIDER NOTES
Encounter Date: 9/12/2022    SCRIBE #1 NOTE: I, [place] Mcgovern, am scribing for, and in the presence of,  Noah Jolly MD. I have scribed the following portions of the note - Other sections scribed: HPI, ROS, PE.     History     Chief Complaint   Patient presents with    Hand Pain      Pt dx with cellulitis on right hand on 9/7,  pt complains of continued pain to area, right thumb area swollen. Pt has hx of DM,  pt also reports intermittent blurry vision, pt dx with dm on 9/7 and request additional labs and info regarding management of DM      Navi Álvarez is a 36 y.o. male with a PMHx of small bowel obstruction who presents to the ED for evaluation of intermittent dizziness. Pt notes chest pain secondary to his dizziness. Pt visited the ED last week for right hand swelling and pain. Pt was sent home with 2 new medications, though he does not recall their name or purpose. Associated symptoms include weakness. Pt denies checking his blood sugar at home. Pt denies fever or shortness of breath.    History obtained using     The history is provided by the patient.   Review of patient's allergies indicates:  No Known Allergies  Past Medical History:   Diagnosis Date    SBO (small bowel obstruction) 8/17/2020     Past Surgical History:   Procedure Laterality Date    DIAGNOSTIC LAPAROSCOPY N/A 8/17/2020    Procedure: LAPAROSCOPY, DIAGNOSTIC, possible laparotomy, other as indicated;  Surgeon: Pancho Andrea MD;  Location: [facility] OR;  Service: General;  Laterality: N/A;    LIPOMA RESECTION  8/3/2020    Procedure: EXCISION, LIPOMA;  Surgeon: Pancho Andrea MD;  Location: [facility] OR;  Service: General;;    LYSIS OF ADHESIONS N/A 8/17/2020    Procedure: LYSIS, ADHESIONS;  Surgeon: Pancho Andrea MD;  Location: [facility] OR;  Service: General;  Laterality: N/A;    ROBOT-ASSISTED LAPAROSCOPIC REPAIR OF INGUINAL HERNIA Bilateral 8/3/2020    Procedure: ROBOTIC REPAIR, HERNIA, INGUINAL;  Surgeon: Pancho SIGALA  MD Zully;  Location: [facility] OR;  Service: General;  Laterality: Bilateral;     No family history on file.  Social History     Tobacco Use    Smoking status: Never    Smokeless tobacco: Former     Types: Chew   Substance Use Topics    Alcohol use: Yes     Alcohol/week: 4.0 - 6.0 standard drinks     Types: 4 - 6 Cans of beer per week     Comment: SOMETIMES     Drug use: Never     Review of Systems   Constitutional:  Negative for fever.   Respiratory:  Negative for shortness of breath.    Cardiovascular:  Positive for chest pain.   Musculoskeletal:  Positive for myalgias.   Neurological:  Positive for dizziness.   All other systems reviewed and are negative.    Physical Exam     Initial Vitals [09/12/22 0937]   BP Pulse Resp Temp SpO2   125/76 98 18 97.5 °F (36.4 °C) 99 %      MAP       --         Physical Exam    Nursing note and vitals reviewed.  HENT:   Head: Atraumatic.   Eyes: Conjunctivae and EOM are normal.   Neck:   Normal range of motion.  Cardiovascular:      Exam reveals no gallop and no friction rub.       No murmur heard.  Pulmonary/Chest: Breath sounds normal. No respiratory distress. He has no wheezes. He has no rales.   Abdominal: Abdomen is soft. Bowel sounds are normal. He exhibits no distension. There is no abdominal tenderness.   Musculoskeletal:         General: No edema. Normal range of motion.      Cervical back: Normal range of motion.      Comments: Chronic contracture noted to R hand, no erythema or fluctuance     Neurological: He is alert and oriented to person, place, and time.   Skin: Skin is warm and dry.   Psychiatric: He has a normal mood and affect.       ED Course   Procedures  Labs Reviewed   CBC W/ AUTO DIFFERENTIAL - Abnormal; Notable for the following components:       Result Value    MCH 34.4 (*)     RDW 10.9 (*)     All other components within normal limits   COMPREHENSIVE METABOLIC PANEL - Abnormal; Notable for the following components:    Sodium 135 (*)     Glucose 235 (*)      ALT 52 (*)     All other components within normal limits   MAGNESIUM        ECG Results              EKG 12-lead (In process)  Result time 09/12/22 12:52:39      In process by Interface, Lab In [facility] (09/12/22 12:52:39)                   Narrative:    Test Reason : R53.1,    Vent. Rate : 083 BPM     Atrial Rate : 083 BPM     P-R Int : 130 ms          QRS Dur : 090 ms      QT Int : 358 ms       P-R-T Axes : 040 073 039 degrees     QTc Int : 420 ms    Normal sinus rhythm  Normal ECG  No previous ECGs available    Referred By: System System           Confirmed By:                                   Imaging Results    None          Medications - No data to display  Medical Decision Making:   History:   Old Medical Records: I decided to obtain old medical records.  Initial Assessment:   36-year-old male with history of recently diagnosed diabetes presenting for dizziness and weakness for amount.  Patient had been seen twice recently for hand pain and says this is getting better.  Appears to be in no distress on exam.  Vital signs unremarkable.  Patient's hand swelling seems to be improving.  No evidence of infection on exam.  Abdomen benign.  Lungs clear.  Labs show mild hyperglycemia without evidence of DKA.  Diabetic teaching provided.  Patient was discharged home with metformin last visit.  He will continue this medication.          Scribe Attestation:   Scribe #1: I performed the above scribed service and the documentation accurately describes the services I performed. I attest to the accuracy of the note.               Clinical Impression:   Final diagnoses:  [R53.1] Weak  [E11.65] Type 2 diabetes mellitus with hyperglycemia, without long-term current use of insulin (Primary)  [M79.641] Right hand pain      ED Disposition Condition    Discharge Stable          ED Prescriptions       Medication Sig Dispense Start Date End Date Auth. Provider    blood-glucose meter (TRUE METRIX GLUCOSE METER) kit  (Status:  Discontinued) Use as instructed 1 each 9/12/2022 9/12/2022 Amarjit Cartwright MD    blood-glucose meter (TRUE METRIX GLUCOSE METER) kit Use as instructed 1 each 9/12/2022 9/12/2023 Amarjit Cartwright MD          Follow-up Information       Follow up With Specialties Details Why Contact Info    Primary care  Schedule an appointment as soon as possible for a visit             Physician Attestation for Scribe: I, amarjit cartwright, reviewed documentation as scribed in my presence, which is both accurate and complete.     Amarjit Cartwright MD  09/12/22 9260

## 2023-01-07 ENCOUNTER — HOSPITAL ENCOUNTER (EMERGENCY)
Facility: HOSPITAL | Age: 38
Discharge: HOME | End: 2023-01-08
Attending: STUDENT IN AN ORGANIZED HEALTH CARE EDUCATION/TRAINING PROGRAM

## 2023-01-07 DIAGNOSIS — E11.42 DIABETIC POLYNEUROPATHY ASSOCIATED WITH TYPE 2 DIABETES MELLITUS: ICD-10-CM

## 2023-01-07 DIAGNOSIS — E11.65 UNCONTROLLED TYPE 2 DIABETES MELLITUS WITH HYPERGLYCEMIA: Primary | ICD-10-CM

## 2023-01-07 LAB
ALBUMIN SERPL BCP-MCNC: 4.1 G/DL (ref 3.5–5.2)
ALP SERPL-CCNC: 142 U/L (ref 55–135)
ALT SERPL W/O P-5'-P-CCNC: 34 U/L (ref 10–44)
ANION GAP SERPL CALC-SCNC: 9 MMOL/L (ref 8–16)
AST SERPL-CCNC: 20 U/L (ref 10–40)
B-OH-BUTYR BLD STRIP-SCNC: 0.3 MMOL/L (ref 0–0.5)
BASOPHILS # BLD AUTO: 0.03 K/UL (ref 0–0.2)
BASOPHILS NFR BLD: 0.6 % (ref 0–1.9)
BILIRUB SERPL-MCNC: 0.4 MG/DL (ref 0.1–1)
BUN SERPL-MCNC: 13 MG/DL (ref 6–20)
CALCIUM SERPL-MCNC: 10.1 MG/DL (ref 8.7–10.5)
CHLORIDE SERPL-SCNC: 96 MMOL/L (ref 95–110)
CO2 SERPL-SCNC: 26 MMOL/L (ref 23–29)
CREAT SERPL-MCNC: 0.8 MG/DL (ref 0.5–1.4)
DIFFERENTIAL METHOD: ABNORMAL
EOSINOPHIL # BLD AUTO: 0.1 K/UL (ref 0–0.5)
EOSINOPHIL NFR BLD: 2.3 % (ref 0–8)
ERYTHROCYTE [DISTWIDTH] IN BLOOD BY AUTOMATED COUNT: 11.7 % (ref 11.5–14.5)
EST. GFR  (NO RACE VARIABLE): >60 ML/MIN/1.73 M^2
GLUCOSE SERPL-MCNC: 355 MG/DL (ref 70–110)
HCT VFR BLD AUTO: 42.5 % (ref 40–54)
HGB BLD-MCNC: 14.5 G/DL (ref 14–18)
IMM GRANULOCYTES # BLD AUTO: 0.02 K/UL (ref 0–0.04)
IMM GRANULOCYTES NFR BLD AUTO: 0.4 % (ref 0–0.5)
INFLUENZA A, MOLECULAR: NEGATIVE
INFLUENZA B, MOLECULAR: NEGATIVE
LYMPHOCYTES # BLD AUTO: 2 K/UL (ref 1–4.8)
LYMPHOCYTES NFR BLD: 42.6 % (ref 18–48)
MCH RBC QN AUTO: 33.8 PG (ref 27–31)
MCHC RBC AUTO-ENTMCNC: 34.1 G/DL (ref 32–36)
MCV RBC AUTO: 99 FL (ref 82–98)
MONOCYTES # BLD AUTO: 0.3 K/UL (ref 0.3–1)
MONOCYTES NFR BLD: 7 % (ref 4–15)
NEUTROPHILS # BLD AUTO: 2.2 K/UL (ref 1.8–7.7)
NEUTROPHILS NFR BLD: 47.1 % (ref 38–73)
NRBC BLD-RTO: 0 /100 WBC
PLATELET # BLD AUTO: 176 K/UL (ref 150–450)
PMV BLD AUTO: 10.1 FL (ref 9.2–12.9)
POCT GLUCOSE: 381 MG/DL (ref 70–110)
POTASSIUM SERPL-SCNC: 4.6 MMOL/L (ref 3.5–5.1)
PROT SERPL-MCNC: 7.8 G/DL (ref 6–8.4)
RBC # BLD AUTO: 4.29 M/UL (ref 4.6–6.2)
SARS-COV-2 RDRP RESP QL NAA+PROBE: NEGATIVE
SODIUM SERPL-SCNC: 131 MMOL/L (ref 136–145)
SPECIMEN SOURCE: NORMAL
WBC # BLD AUTO: 4.7 K/UL (ref 3.9–12.7)

## 2023-01-07 PROCEDURE — 83036 HEMOGLOBIN GLYCOSYLATED A1C: CPT | Performed by: STUDENT IN AN ORGANIZED HEALTH CARE EDUCATION/TRAINING PROGRAM

## 2023-01-07 PROCEDURE — 99284 EMERGENCY DEPT VISIT MOD MDM: CPT | Mod: 25

## 2023-01-07 PROCEDURE — U0002 COVID-19 LAB TEST NON-CDC: HCPCS | Performed by: STUDENT IN AN ORGANIZED HEALTH CARE EDUCATION/TRAINING PROGRAM

## 2023-01-07 PROCEDURE — 96360 HYDRATION IV INFUSION INIT: CPT

## 2023-01-07 PROCEDURE — 82962 GLUCOSE BLOOD TEST: CPT

## 2023-01-07 PROCEDURE — 96361 HYDRATE IV INFUSION ADD-ON: CPT

## 2023-01-07 PROCEDURE — 25000003 PHARM REV CODE 250: Performed by: STUDENT IN AN ORGANIZED HEALTH CARE EDUCATION/TRAINING PROGRAM

## 2023-01-07 PROCEDURE — 82010 KETONE BODYS QUAN: CPT | Performed by: STUDENT IN AN ORGANIZED HEALTH CARE EDUCATION/TRAINING PROGRAM

## 2023-01-07 PROCEDURE — 80053 COMPREHEN METABOLIC PANEL: CPT | Performed by: STUDENT IN AN ORGANIZED HEALTH CARE EDUCATION/TRAINING PROGRAM

## 2023-01-07 PROCEDURE — 87502 INFLUENZA DNA AMP PROBE: CPT | Performed by: STUDENT IN AN ORGANIZED HEALTH CARE EDUCATION/TRAINING PROGRAM

## 2023-01-07 PROCEDURE — 85025 COMPLETE CBC W/AUTO DIFF WBC: CPT | Performed by: STUDENT IN AN ORGANIZED HEALTH CARE EDUCATION/TRAINING PROGRAM

## 2023-01-07 RX ORDER — ACETAMINOPHEN 500 MG
1000 TABLET ORAL
Status: COMPLETED | OUTPATIENT
Start: 2023-01-07 | End: 2023-01-07

## 2023-01-07 RX ADMIN — ACETAMINOPHEN 1000 MG: 500 TABLET ORAL at 11:01

## 2023-01-07 RX ADMIN — SODIUM CHLORIDE 1000 ML: 0.9 INJECTION, SOLUTION INTRAVENOUS at 11:01

## 2023-01-08 VITALS
DIASTOLIC BLOOD PRESSURE: 69 MMHG | OXYGEN SATURATION: 100 % | RESPIRATION RATE: 18 BRPM | TEMPERATURE: 98 F | HEART RATE: 77 BPM | SYSTOLIC BLOOD PRESSURE: 130 MMHG

## 2023-01-08 PROBLEM — E11.65 UNCONTROLLED TYPE 2 DIABETES MELLITUS WITH HYPERGLYCEMIA: Status: ACTIVE | Noted: 2023-01-08

## 2023-01-08 LAB
ESTIMATED AVG GLUCOSE: 266 MG/DL (ref 68–131)
HBA1C MFR BLD: 10.9 % (ref 4–5.6)

## 2023-01-08 PROCEDURE — 25000003 PHARM REV CODE 250: Performed by: STUDENT IN AN ORGANIZED HEALTH CARE EDUCATION/TRAINING PROGRAM

## 2023-01-08 RX ORDER — METFORMIN HYDROCHLORIDE 500 MG/1
500 TABLET ORAL 2 TIMES DAILY WITH MEALS
Qty: 30 TABLET | Refills: 0 | OUTPATIENT
Start: 2023-01-08 | End: 2023-08-13

## 2023-01-08 RX ORDER — GABAPENTIN 300 MG/1
300 CAPSULE ORAL 3 TIMES DAILY
Qty: 90 CAPSULE | Refills: 11 | OUTPATIENT
Start: 2023-01-08 | End: 2023-08-13

## 2023-01-08 RX ORDER — METFORMIN HYDROCHLORIDE 500 MG/1
500 TABLET ORAL ONCE
Status: COMPLETED | OUTPATIENT
Start: 2023-01-08 | End: 2023-01-08

## 2023-01-08 RX ADMIN — METFORMIN HYDROCHLORIDE 500 MG: 500 TABLET ORAL at 01:01

## 2023-01-08 NOTE — ED PROVIDER NOTES
Encounter Date: 1/7/2023    SCRIBE #1 NOTE: I, Benaris Hidalgo, am scribing for, and in the presence of,  Gerardo Villaseñor.     History     Chief Complaint   Patient presents with    Abdominal Pain     Enrique Payne    Patient reports abdominal pain after eating, body aches, weakness, tingly sensation to bilateral feet. Has not had oral medication for diabetes in about a month due to not seeing a doctor for refill.      Navi Álvarez is a 37 y.o. male who  has a past medical history of SBO (small bowel obstruction) (8/17/2020).    The patient presents to the ED due to paresthesias.   Patient reports experiencing 2 weeks of feet tingling and numbness, body aches, and blurry vision. Associated abdominal pain when eating. Denies back pain. He has been out of his diabetes medication for 1 month.        The history is provided by the patient. The history is limited by a language barrier. A  was used ([id number]).   Review of patient's allergies indicates:  No Known Allergies  Past Medical History:   Diagnosis Date    SBO (small bowel obstruction) 8/17/2020    Uncontrolled type 2 diabetes mellitus with hyperglycemia 1/8/2023     Past Surgical History:   Procedure Laterality Date    DIAGNOSTIC LAPAROSCOPY N/A 8/17/2020    Procedure: LAPAROSCOPY, DIAGNOSTIC, possible laparotomy, other as indicated;  Surgeon: Pancho Andrea MD;  Location: [facility] OR;  Service: General;  Laterality: N/A;    LIPOMA RESECTION  8/3/2020    Procedure: EXCISION, LIPOMA;  Surgeon: Pancho Andrea MD;  Location: [facility] OR;  Service: General;;    LYSIS OF ADHESIONS N/A 8/17/2020    Procedure: LYSIS, ADHESIONS;  Surgeon: Pancho Andrea MD;  Location: [facility] OR;  Service: General;  Laterality: N/A;    ROBOT-ASSISTED LAPAROSCOPIC REPAIR OF INGUINAL HERNIA Bilateral 8/3/2020    Procedure: ROBOTIC REPAIR, HERNIA, INGUINAL;  Surgeon: Pancho Andrea MD;  Location: [facility] OR;  Service: General;  Laterality: Bilateral;     No  family history on file.  Social History     Tobacco Use    Smoking status: Never    Smokeless tobacco: Former     Types: Chew   Substance Use Topics    Alcohol use: Yes     Alcohol/week: 4.0 - 6.0 standard drinks     Types: 4 - 6 Cans of beer per week     Comment: SOMETIMES     Drug use: Never     Review of Systems   All other systems reviewed and are negative.    Physical Exam     Initial Vitals [01/07/23 2203]   BP Pulse Resp Temp SpO2   136/82 108 18 98.1 °F (36.7 °C) 99 %      MAP       --         Physical Exam    Nursing note and vitals reviewed.  Constitutional: He appears well-developed and well-nourished. No distress.   HENT:   Head: Normocephalic and atraumatic.   Right Ear: External ear normal.   Left Ear: External ear normal.   Nose: Nose normal.   Mouth/Throat: Oropharynx is clear and moist.   Eyes: Conjunctivae and EOM are normal. Pupils are equal, round, and reactive to light.   Neck: Neck supple.   Normal range of motion.  Cardiovascular:  Normal rate and regular rhythm.           No murmur heard.  Pulmonary/Chest: Breath sounds normal. No stridor. No respiratory distress. He has no wheezes. He has no rhonchi. He has no rales.   Abdominal: Abdomen is soft. Bowel sounds are normal. There is no abdominal tenderness.   Musculoskeletal:         General: No edema. Normal range of motion.      Cervical back: Normal range of motion and neck supple.     Neurological: He is alert and oriented to person, place, and time. He has normal strength. No cranial nerve deficit or sensory deficit.   Normal sensation throughout all distributions of bilateral feet.   Skin: Skin is warm and dry. No rash noted.   Psychiatric: He has a normal mood and affect. Thought content normal.       ED Course   Procedures  Labs Reviewed   CBC W/ AUTO DIFFERENTIAL - Abnormal; Notable for the following components:       Result Value    RBC 4.29 (*)     MCV 99 (*)     MCH 33.8 (*)     All other components within normal limits    COMPREHENSIVE METABOLIC PANEL - Abnormal; Notable for the following components:    Sodium 131 (*)     Glucose 355 (*)     Alkaline Phosphatase 142 (*)     All other components within normal limits   HEMOGLOBIN A1C - Abnormal; Notable for the following components:    Hemoglobin A1C 10.9 (*)     Estimated Avg Glucose 266 (*)     All other components within normal limits   POCT GLUCOSE - Abnormal; Notable for the following components:    POCT Glucose 381 (*)     All other components within normal limits   INFLUENZA A & B BY MOLECULAR   BETA - HYDROXYBUTYRATE, SERUM   SARS-COV-2 RNA AMPLIFICATION, QUAL          Imaging Results    None          Medications   acetaminophen tablet 1,000 mg (1,000 mg Oral Given 1/7/23 8429)   sodium chloride 0.9% bolus 1,000 mL 1,000 mL (0 mLs Intravenous Stopped 1/8/23 0151)   metFORMIN tablet 500 mg (500 mg Oral Given 1/8/23 0147)     Medical Decision Making:   Differential Diagnosis:   Diabetic hyperglycemia, DKA, HHS, diabetic polyneuropathy.  Clinical Tests:   Lab Tests: Ordered and Reviewed  The following lab test(s) were unremarkable: CBC       <> Summary of Lab: Pseudohyponatremia related to hyperglycemia.  Elevated A1c 10.9.  Negative beta hydroxybutyrate.  No signs of DKA on lab work.  Patient denied abdominal pain at time of presentation to myself.  Out of diabetic medication so have represcribed.  Will also prescribe gabapentin for presumed diabetic neuropathy.  Discussed importance of tighter glucose control.  Patient should follow up with primary care provider.  Return ED precautions given for worsening or changing symptoms.                        Clinical Impression:   Final diagnoses:  [E11.65] Uncontrolled type 2 diabetes mellitus with hyperglycemia (Primary)  [E11.42] Diabetic polyneuropathy associated with type 2 diabetes mellitus        ED Disposition Condition    Discharge Stable          ED Prescriptions       Medication Sig Dispense Start Date End Date Auth.  Provider    metFORMIN (GLUCOPHAGE) 500 MG tablet Take 1 tablet (500 mg total) by mouth 2 (two) times daily with meals. for 15 days 30 tablet 1/8/2023 1/23/2023 Gerardo Villaseñor MD    gabapentin (NEURONTIN) 300 MG capsule Take 1 capsule (300 mg total) by mouth 3 (three) times daily. 90 capsule 1/8/2023 1/8/2024 Gerardo Villaseñor MD          Follow-up Information       Follow up With Specialties Details Why Contact Info    Primary care provider of your choice  Schedule an appointment as soon as possible for a visit in 5 days For follow-up on today's visit.     [facility] Emergency Dept Emergency Medicine Go to  As needed, If symptoms worsen [address] [facility] [id number] [phone]            I, Gerardo Villaseñor MD personally performed the services described in this documentation. All medical record entries made by the scribe were at my direction and in my presence.  I have reviewed the chart and agree that the record reflects my personal performance and is accurate and complete.   12:44 AM 01/09/2023       DISCLAIMER: This note was prepared with wizbo[employer] Direct voice recognition transcription software. Garbled syntax, mangled pronouns, and other bizarre constructions may be attributed to that software system.         Gerardo Villaseñor MD  01/09/23 0045

## 2023-01-08 NOTE — ED NOTES
Review of patient's allergies indicates:  No Known Allergies     Patient has verified the spelling of their name and  on armband.   APPEARANCE: Patient is alert, calm, oriented x 4, and does not appear distressed.  SKIN: Skin is normal for race, warm, and dry. Normal skin turgor and mucous membranes moist.  CARDIAC: Normal rate and rhythm, no murmur heard.   RESPIRATORY:Normal rate and effort. Breath sounds clear bilaterally throughout chest. Respirations are equal and unlabored.    GASTRO: Bowel sounds normal, abdomen is soft, no tenderness, and no abdominal distention.  MUSCLE: Full ROM. No bony tenderness or soft tissue tenderness. No obvious deformity.  PERIPHERAL VASCULAR: peripheral pulses present. Normal cap refill. No edema. Warm to touch.  NEURO: 5/5 strength major flexors/extensors bilaterally. Sensory intact to light touch bilaterally. Denys coma scale: eyes open spontaneously-4, oriented & converses-5, obeys commands-6. No neurological abnormalities.   MENTAL STATUS: awake, alert and aware of environment.  : Voids without complication      Pt SR up x 2. Bed in lowest position with wheels locked. Call bell within reach of pt.

## 2023-05-24 ENCOUNTER — HOSPITAL ENCOUNTER (EMERGENCY)
Facility: HOSPITAL | Age: 38
Discharge: HOME | End: 2023-05-24
Attending: EMERGENCY MEDICINE

## 2023-05-24 VITALS
RESPIRATION RATE: 19 BRPM | HEART RATE: 98 BPM | OXYGEN SATURATION: 100 % | SYSTOLIC BLOOD PRESSURE: 161 MMHG | TEMPERATURE: 98 F | DIASTOLIC BLOOD PRESSURE: 90 MMHG

## 2023-05-24 DIAGNOSIS — R07.9 CHEST PAIN, UNSPECIFIED TYPE: Primary | ICD-10-CM

## 2023-05-24 DIAGNOSIS — R00.2 PALPITATIONS: ICD-10-CM

## 2023-05-24 DIAGNOSIS — R07.9 CHEST PAIN: ICD-10-CM

## 2023-05-24 LAB
ALBUMIN SERPL BCP-MCNC: 4.3 G/DL (ref 3.5–5.2)
ALP SERPL-CCNC: 85 U/L (ref 55–135)
ALT SERPL W/O P-5'-P-CCNC: 104 U/L (ref 10–44)
ANION GAP SERPL CALC-SCNC: 15 MMOL/L (ref 8–16)
AST SERPL-CCNC: 68 U/L (ref 10–40)
BASOPHILS # BLD AUTO: 0.04 K/UL (ref 0–0.2)
BASOPHILS NFR BLD: 1 % (ref 0–1.9)
BILIRUB SERPL-MCNC: 0.6 MG/DL (ref 0.1–1)
BNP SERPL-MCNC: 13 PG/ML (ref 0–99)
BUN SERPL-MCNC: 6 MG/DL (ref 6–20)
CALCIUM SERPL-MCNC: 9.5 MG/DL (ref 8.7–10.5)
CHLORIDE SERPL-SCNC: 99 MMOL/L (ref 95–110)
CO2 SERPL-SCNC: 19 MMOL/L (ref 23–29)
CREAT SERPL-MCNC: 0.7 MG/DL (ref 0.5–1.4)
D DIMER PPP IA.FEU-MCNC: 0.28 MG/L FEU
DIFFERENTIAL METHOD: ABNORMAL
EOSINOPHIL # BLD AUTO: 0 K/UL (ref 0–0.5)
EOSINOPHIL NFR BLD: 0.3 % (ref 0–8)
ERYTHROCYTE [DISTWIDTH] IN BLOOD BY AUTOMATED COUNT: 11.8 % (ref 11.5–14.5)
EST. GFR  (NO RACE VARIABLE): >60 ML/MIN/1.73 M^2
GLUCOSE SERPL-MCNC: 209 MG/DL (ref 70–110)
HCT VFR BLD AUTO: 39.6 % (ref 40–54)
HGB BLD-MCNC: 14.2 G/DL (ref 14–18)
IMM GRANULOCYTES # BLD AUTO: 0.01 K/UL (ref 0–0.04)
IMM GRANULOCYTES NFR BLD AUTO: 0.3 % (ref 0–0.5)
LYMPHOCYTES # BLD AUTO: 0.9 K/UL (ref 1–4.8)
LYMPHOCYTES NFR BLD: 22.5 % (ref 18–48)
MCH RBC QN AUTO: 34.8 PG (ref 27–31)
MCHC RBC AUTO-ENTMCNC: 35.9 G/DL (ref 32–36)
MCV RBC AUTO: 97 FL (ref 82–98)
MONOCYTES # BLD AUTO: 0.2 K/UL (ref 0.3–1)
MONOCYTES NFR BLD: 5.9 % (ref 4–15)
NEUTROPHILS # BLD AUTO: 2.7 K/UL (ref 1.8–7.7)
NEUTROPHILS NFR BLD: 70 % (ref 38–73)
NRBC BLD-RTO: 0 /100 WBC
PLATELET # BLD AUTO: 148 K/UL (ref 150–450)
PMV BLD AUTO: 9.8 FL (ref 9.2–12.9)
POTASSIUM SERPL-SCNC: 3.7 MMOL/L (ref 3.5–5.1)
PROT SERPL-MCNC: 8.1 G/DL (ref 6–8.4)
RBC # BLD AUTO: 4.08 M/UL (ref 4.6–6.2)
SODIUM SERPL-SCNC: 133 MMOL/L (ref 136–145)
TROPONIN I SERPL DL<=0.01 NG/ML-MCNC: <0.006 NG/ML (ref 0–0.03)
TROPONIN I SERPL DL<=0.01 NG/ML-MCNC: <0.006 NG/ML (ref 0–0.03)
TSH SERPL DL<=0.005 MIU/L-ACNC: 1.72 UIU/ML (ref 0.4–4)
WBC # BLD AUTO: 3.91 K/UL (ref 3.9–12.7)

## 2023-05-24 PROCEDURE — 93005 ELECTROCARDIOGRAM TRACING: CPT

## 2023-05-24 PROCEDURE — 93010 ELECTROCARDIOGRAM REPORT: CPT | Mod: ,,, | Performed by: INTERNAL MEDICINE

## 2023-05-24 PROCEDURE — 25000003 PHARM REV CODE 250: Performed by: EMERGENCY MEDICINE

## 2023-05-24 PROCEDURE — 80053 COMPREHEN METABOLIC PANEL: CPT | Performed by: EMERGENCY MEDICINE

## 2023-05-24 PROCEDURE — 99285 EMERGENCY DEPT VISIT HI MDM: CPT | Mod: 25

## 2023-05-24 PROCEDURE — 85025 COMPLETE CBC W/AUTO DIFF WBC: CPT | Performed by: EMERGENCY MEDICINE

## 2023-05-24 PROCEDURE — 84443 ASSAY THYROID STIM HORMONE: CPT | Performed by: EMERGENCY MEDICINE

## 2023-05-24 PROCEDURE — 96361 HYDRATE IV INFUSION ADD-ON: CPT

## 2023-05-24 PROCEDURE — 84484 ASSAY OF TROPONIN QUANT: CPT | Performed by: EMERGENCY MEDICINE

## 2023-05-24 PROCEDURE — 85379 FIBRIN DEGRADATION QUANT: CPT | Performed by: EMERGENCY MEDICINE

## 2023-05-24 PROCEDURE — 93010 EKG 12-LEAD: ICD-10-PCS | Mod: ,,, | Performed by: INTERNAL MEDICINE

## 2023-05-24 PROCEDURE — 83880 ASSAY OF NATRIURETIC PEPTIDE: CPT | Performed by: EMERGENCY MEDICINE

## 2023-05-24 PROCEDURE — 96360 HYDRATION IV INFUSION INIT: CPT

## 2023-05-24 RX ORDER — SODIUM CHLORIDE 9 MG/ML
1000 INJECTION, SOLUTION INTRAVENOUS
Status: COMPLETED | OUTPATIENT
Start: 2023-05-24 | End: 2023-05-24

## 2023-05-24 RX ADMIN — SODIUM CHLORIDE 1000 ML: 0.9 INJECTION, SOLUTION INTRAVENOUS at 07:05

## 2023-05-24 NOTE — ED PROVIDER NOTES
"Encounter Date: 5/24/2023       History     Chief Complaint   Patient presents with    Chest Pain     Pt c/o left sided chest pain that is constant and started yesterday. Pt states to , "I feel like my heart stops and restarts."      Patient is a 37-year-old male who presents to the ED with complaint of left-sided chest pain that started yesterday.  Patient reports left-sided chest pain described as pressure without radiation with associated palpitations that started yesterday.  Patient states that "my heart stops and restarts." He denies any abdminal pain, nausea, vomiting, shortness of breath, diaphoresis, numbness, tingling, neck pain or jaw pain.  He is not taken anything for symptoms.  He denies any use of illicit drugs.  Does report recent use of energy drinks.  Denies any history of thyroid disorders.     Review of patient's allergies indicates:  No Known Allergies  Past Medical History:   Diagnosis Date    SBO (small bowel obstruction) 8/17/2020    Uncontrolled type 2 diabetes mellitus with hyperglycemia 1/8/2023     Past Surgical History:   Procedure Laterality Date    DIAGNOSTIC LAPAROSCOPY N/A 8/17/2020    Procedure: LAPAROSCOPY, DIAGNOSTIC, possible laparotomy, other as indicated;  Surgeon: Pancho Andrea MD;  Location: [facility] OR;  Service: General;  Laterality: N/A;    LIPOMA RESECTION  8/3/2020    Procedure: EXCISION, LIPOMA;  Surgeon: Pancho Andrea MD;  Location: [facility] OR;  Service: General;;    LYSIS OF ADHESIONS N/A 8/17/2020    Procedure: LYSIS, ADHESIONS;  Surgeon: Pancho Andrea MD;  Location: [facility] OR;  Service: General;  Laterality: N/A;    ROBOT-ASSISTED LAPAROSCOPIC REPAIR OF INGUINAL HERNIA Bilateral 8/3/2020    Procedure: ROBOTIC REPAIR, HERNIA, INGUINAL;  Surgeon: Pancho Andrea MD;  Location: [facility] OR;  Service: General;  Laterality: Bilateral;     No family history on file.  Social History     Tobacco Use    Smoking status: Never    Smokeless tobacco: Former     Types: " Chew   Substance Use Topics    Alcohol use: Yes     Alcohol/week: 4.0 - 6.0 standard drinks     Types: 4 - 6 Cans of beer per week     Comment: SOMETIMES     Drug use: Never     Review of Systems   Constitutional:  Negative for chills and fever.   Respiratory:  Negative for shortness of breath.    Cardiovascular:  Positive for chest pain, palpitations and leg swelling.   Gastrointestinal:  Negative for abdominal pain, nausea and vomiting.   Genitourinary:  Negative for dysuria and frequency.   Musculoskeletal:  Negative for back pain and myalgias.   Skin:  Negative for pallor.   Neurological:  Negative for dizziness and headaches.   Psychiatric/Behavioral:  Negative for agitation and confusion.      Physical Exam     Initial Vitals [05/24/23 0702]   BP Pulse Resp Temp SpO2   (!) 166/88 (!) 113 20 98.4 °F (36.9 °C) 98 %      MAP       --         Physical Exam    Nursing note and vitals reviewed.  Constitutional: He appears well-developed and well-nourished. He is not diaphoretic. No distress.   HENT:   Head: Normocephalic and atraumatic.   Right Ear: External ear normal.   Left Ear: External ear normal.   Eyes: Conjunctivae and EOM are normal. Pupils are equal, round, and reactive to light.   Neck: Neck supple.   Normal range of motion.  Cardiovascular:  Normal rate, regular rhythm, normal heart sounds and intact distal pulses.           Pulmonary/Chest: Breath sounds normal.   Abdominal: Abdomen is soft. Bowel sounds are normal.   Musculoskeletal:         General: Normal range of motion.      Cervical back: Normal range of motion and neck supple.     Neurological: He is alert and oriented to person, place, and time. He has normal strength. GCS score is 15. GCS eye subscore is 4. GCS verbal subscore is 5. GCS motor subscore is 6.   Skin: Skin is warm and dry. Capillary refill takes less than 2 seconds.   Psychiatric: He has a normal mood and affect.       ED Course   Procedures  Labs Reviewed   CBC W/ AUTO  DIFFERENTIAL - Abnormal; Notable for the following components:       Result Value    RBC 4.08 (*)     Hematocrit 39.6 (*)     MCH 34.8 (*)     Platelets 148 (*)     Lymph # 0.9 (*)     Mono # 0.2 (*)     All other components within normal limits   COMPREHENSIVE METABOLIC PANEL - Abnormal; Notable for the following components:    Sodium 133 (*)     CO2 19 (*)     Glucose 209 (*)     AST 68 (*)      (*)     All other components within normal limits   TROPONIN I   B-TYPE NATRIURETIC PEPTIDE   D DIMER, QUANTITATIVE   TSH   TSH   TROPONIN I     EKG Readings: (Independently Interpreted)   Initial Reading: No STEMI. Rhythm: Normal Sinus Rhythm. Heart Rate: 99. Ectopy: No Ectopy. Conduction: Normal. ST Segments: Normal ST Segments. T Waves: Normal. Axis: Normal.   NSR; no ischemic change; no STEMI    ECG Results              EKG 12-lead (In process)  Result time 05/24/23 08:08:49      In process by Interface, Lab In [facility] (05/24/23 08:08:49)                   Narrative:    Test Reason : R07.9,    Vent. Rate : 108 BPM     Atrial Rate : 108 BPM     P-R Int : 118 ms          QRS Dur : 084 ms      QT Int : 328 ms       P-R-T Axes : 055 075 025 degrees     QTc Int : 439 ms    Sinus tachycardia  Otherwise normal ECG  When compared with ECG of 12-SEP-2022 11:06,  No significant change was found    Referred By: [email or web address]   SELF           Confirmed By:                                   Imaging Results              X-Ray Chest AP Portable (Final result)  Result time 05/24/23 08:36:44      Final result by Dandre Spencer MD (05/24/23 08:36:44)                   Impression:      Normal chest      Electronically signed by: Dandre Spencer MD  Date:    05/24/2023  Time:    08:36               Narrative:    EXAMINATION:  XR CHEST AP PORTABLE    CLINICAL HISTORY:  Chest Pain;    TECHNIQUE:  Single frontal view of the chest was performed.    COMPARISON:  None    FINDINGS:  Cardiac size is normal.  Lungs are clear.  No  "infiltrate is seen.                                       Medications   0.9%  NaCl infusion (0 mLs Intravenous Stopped 5/24/23 1146)     Medical Decision Making:   Initial Assessment:   Patient is a 37-year-old female who presents ED with complaint of chest pain.  Patient has been having left-sided chest pain described as "palpitations" since yesterday.  Denies any associated diaphoresis, shortness of breath, numbness or tingling  Differential Diagnosis:   Pericarditis, thyroid storm, pulmonary embolism, atrial fibrillation, NSTEMI, STEMI, myocarditis  Clinical Tests:   Lab Tests: Reviewed and Ordered  Radiological Study: Ordered and Reviewed  ED Management:  - patient underwent extensive cardiac workup in the ED, which included troponin and delta troponin, D-dimer, chest x-ray, thyroid studies, EKG all of which were unremarkable; patient has little no risk factors for cardiac disease; will place ambulatory referral to Cardiology, order for Holter monitor; patient stable for discharge at this time  - No further intervention is indicated at this time after having taken into account the patient's history, physical exam findings, and empirical and objective data obtained during the patient's emergency department workup.   - The patient is at low risk for an emergent medical condition at this time, and I am of the belief that that it is safe to discharge the patient from the emergency department.   - The patient is instructed to follow up as outpatient as indicated on the discharge paperwork.    - I have discussed the specifics of the workup with the patient and the patient has verbalized understanding of the details of the workup, the diagnosis, the treatment plan, and the need for outpatient follow-up.    - Although the patient has no emergent etiology today this does not preclude the development of an emergent condition so, in addition, I have advised the patient that they can return to the ED and/or activate EMS at " any time with worsening of their symptoms, change of their symptoms, or with any other medical complaint.    - The patient remained comfortable and stable during their visit in the ED.    - Discharge and follow-up instructions discussed with the patient who expressed understanding and willingness to comply with my recommendations.  - Results of all emergency department tests  discussed thoroughly with patient; all patient questions answered; pt in agreement with plan  - Pt instructed to follow up with PCP in 2-3 days for recheck of today's complaints  - Pt given strict emergency department return precautions for any new or worsening of symptoms  - Pt discharged from the emergency department in stable condition, in no acute distress     Additional MDM:   PERC Rule:   Age is greater than or equal to 50 = 0.0  Heart Rate is greater than or equal to 100 = 0.0  SaO2 on room air < 95% = 0.0  Unilateral leg swelling = 0.0  Hemoptysis = 0.0  Recent surgery or trauma = 0.0  Prior PE or DVT =  0.0  Hormone use = 0.00  PERC Score = 0  Heart Score:    History:          Slightly suspicious.  ECG:             Normal  Age:               Less than 45 years  Risk factors: no risk factors known  Troponin:       Less than or equal to normal limit  Final Score: 0          ED Course as of 05/24/23 1649   Wed May 24, 2023   0907 Glucose(!): 209  Abnormal elevation.  [LC]   0907 Sodium(!): 133  Mildly decreased.  [LC]   0907 Hemoglobin: 14.2  Reviewed by self - WNL.  [LC]   0907 D-Dimer: 0.28  Reviewed by self - WNL.  [LC]   0907 TSH: 1.716  Reviewed by self - WNL.  [LC]   0907 Troponin I: <0.006  Reviewed by self - WNL.  [LC]   1134 Troponin I: <0.006 [LC]      ED Course User Index  [LC] Jose Francisco Fajardo MD                   Clinical Impression:   Final diagnoses:  [R07.9] Chest pain  [R07.9] Chest pain, unspecified type (Primary)  [R00.2] Palpitations        ED Disposition Condition    Discharge Stable          ED Prescriptions     None       Follow-up Information       Follow up With Specialties Details Why Contact Info Additional Information    [facility] Family Medicine Schedule an appointment as soon as possible for a visit   [address], [address]  [facility] [address] [phone] Please park in [address] or D and use Mere peña. Take Medical Office Bldg. elevators.             Jose Francisco Fajardo MD  05/24/23 9499       Jose Francisco Fajardo MD  05/24/23 1147

## 2023-05-24 NOTE — Clinical Note
"Tramaine Bush" Navi Hood was seen and treated in our emergency department on 5/24/2023.  He may return to work on 05/27/2023.       If you have any questions or concerns, please don't hesitate to call.      Ivory Ortiz RN    "

## 2023-08-13 ENCOUNTER — HOSPITAL ENCOUNTER (EMERGENCY)
Facility: HOSPITAL | Age: 38
Discharge: HOME | End: 2023-08-13
Attending: EMERGENCY MEDICINE

## 2023-08-13 VITALS
HEART RATE: 94 BPM | OXYGEN SATURATION: 98 % | SYSTOLIC BLOOD PRESSURE: 144 MMHG | DIASTOLIC BLOOD PRESSURE: 79 MMHG | TEMPERATURE: 98 F | RESPIRATION RATE: 17 BRPM

## 2023-08-13 DIAGNOSIS — E11.42 DIABETIC POLYNEUROPATHY ASSOCIATED WITH TYPE 2 DIABETES MELLITUS: Primary | ICD-10-CM

## 2023-08-13 DIAGNOSIS — E11.65 UNCONTROLLED TYPE 2 DIABETES MELLITUS WITH HYPERGLYCEMIA: ICD-10-CM

## 2023-08-13 LAB — POCT GLUCOSE: 370 MG/DL (ref 70–110)

## 2023-08-13 PROCEDURE — 82962 GLUCOSE BLOOD TEST: CPT

## 2023-08-13 PROCEDURE — 25000003 PHARM REV CODE 250: Performed by: EMERGENCY MEDICINE

## 2023-08-13 PROCEDURE — 99284 EMERGENCY DEPT VISIT MOD MDM: CPT

## 2023-08-13 RX ORDER — GABAPENTIN 100 MG/1
100 CAPSULE ORAL ONCE
Status: COMPLETED | OUTPATIENT
Start: 2023-08-13 | End: 2023-08-13

## 2023-08-13 RX ORDER — GABAPENTIN 100 MG/1
100 CAPSULE ORAL 3 TIMES DAILY
Qty: 90 CAPSULE | Refills: 0 | Status: SHIPPED | OUTPATIENT
Start: 2023-08-13 | End: 2023-09-13

## 2023-08-13 RX ORDER — METFORMIN HYDROCHLORIDE 1000 MG/1
1000 TABLET ORAL 2 TIMES DAILY WITH MEALS
Qty: 60 TABLET | Refills: 0 | Status: SHIPPED | OUTPATIENT
Start: 2023-08-13 | End: 2023-09-13

## 2023-08-13 RX ORDER — METFORMIN HYDROCHLORIDE 500 MG/1
1000 TABLET ORAL ONCE
Status: COMPLETED | OUTPATIENT
Start: 2023-08-13 | End: 2023-08-13

## 2023-08-13 RX ADMIN — GABAPENTIN 100 MG: 100 CAPSULE ORAL at 03:08

## 2023-08-13 RX ADMIN — METFORMIN HYDROCHLORIDE 1000 MG: 500 TABLET, FILM COATED ORAL at 03:08

## 2023-08-13 NOTE — ED NOTES
Pt educated thoroughly on the importance of obtaining PCP and getting prescriptions filled. Pt VU and denied any questions. Patient provided with and educated on discharge instructions, prescriptions, and follow up care. Advised to return to ED as needed. Stable and ambulatory upon departure.

## 2023-08-13 NOTE — DISCHARGE INSTRUCTIONS

## 2023-08-13 NOTE — ED NOTES
Assessment completed with use of  [id number]. Patient presents to the ED with c/o pain and numbness to bilateral feet x several months. Patient does not have any injury or wound noted. States the pain is increasingly worsening with ambulation. Patient endorses hx of diabetes and states he has not been on any medications in at least 2 months. Patient denies hx of neuropathy. Awaiting orders. Updated on care plan. EKATERINA. [facility].

## 2023-08-13 NOTE — ED PROVIDER NOTES
I would like someone knocking not only Encounter Date: 8/13/2023       History     Chief Complaint   Patient presents with    Foot Pain     Pt has hx of DM, complains of bilateral foot pain x 1 mth, denies any wounds noted to feet, VHW=032 in triage      37-year-old male with past medical history diabetes presents with complaint of paresthesias in his feet.  Patient says that for the past month he has had numbness and tingling in his bilateral lower extremities.  He says that he is not currently taking any medications for diabetes because he ran out of them.  He denies any abdominal pain, vomiting, diarrhea, fever.    The history is provided by the patient. The history is limited by a language barrier. A  was used.     Review of patient's allergies indicates:  No Known Allergies  Past Medical History:   Diagnosis Date    SBO (small bowel obstruction) 8/17/2020    Uncontrolled type 2 diabetes mellitus with hyperglycemia 1/8/2023     Past Surgical History:   Procedure Laterality Date    DIAGNOSTIC LAPAROSCOPY N/A 8/17/2020    Procedure: LAPAROSCOPY, DIAGNOSTIC, possible laparotomy, other as indicated;  Surgeon: Pancho Andrea MD;  Location: [facility] OR;  Service: General;  Laterality: N/A;    LIPOMA RESECTION  8/3/2020    Procedure: EXCISION, LIPOMA;  Surgeon: Pancho Andrea MD;  Location: [facility] OR;  Service: General;;    LYSIS OF ADHESIONS N/A 8/17/2020    Procedure: LYSIS, ADHESIONS;  Surgeon: Pancho Andrea MD;  Location: [facility] OR;  Service: General;  Laterality: N/A;    ROBOT-ASSISTED LAPAROSCOPIC REPAIR OF INGUINAL HERNIA Bilateral 8/3/2020    Procedure: ROBOTIC REPAIR, HERNIA, INGUINAL;  Surgeon: Pancho Andrea MD;  Location: [facility] OR;  Service: General;  Laterality: Bilateral;     No family history on file.  Social History     Tobacco Use    Smoking status: Never    Smokeless tobacco: Former     Types: Chew   Substance Use Topics    Alcohol use: Yes     Alcohol/week: 4.0 - 6.0 standard  drinks of alcohol     Types: 4 - 6 Cans of beer per week     Comment: SOMETIMES     Drug use: Never     Review of Systems    Physical Exam     Initial Vitals [08/13/23 1437]   BP Pulse Resp Temp SpO2   (!) 146/88 105 20 98.3 °F (36.8 °C) 99 %      MAP       --         Physical Exam    Constitutional: He appears well-developed and well-nourished. He is not diaphoretic. No distress.   HENT:   Head: Normocephalic and atraumatic.   Eyes: EOM are normal.   Neck:   Normal range of motion.  Cardiovascular:  Normal rate.           Pulmonary/Chest: No respiratory distress.   Abdominal: Abdomen is soft. He exhibits no distension. There is no abdominal tenderness.   Musculoskeletal:         General: Normal range of motion.      Cervical back: Normal range of motion.      Comments: 2+ DP pulses bilaterally. Extremities warm, well-perfused.     Neurological: He is alert and oriented to person, place, and time. He has normal strength.   Skin: Skin is warm and dry.   No visible wounds on bilateral lower extremities.   Psychiatric: He has a normal mood and affect.       ED Course   Procedures  Labs Reviewed   POCT GLUCOSE - Abnormal; Notable for the following components:       Result Value    POCT Glucose 370 (*)     All other components within normal limits          Imaging Results    None          Medications   metFORMIN tablet 1,000 mg (1,000 mg Oral Given 8/13/23 1555)   gabapentin capsule 100 mg (100 mg Oral Given 8/13/23 1555)     Medical Decision Making:   History:   Old Records Summarized: other records.       <> Summary of Records: Seen 1/7/23 for similar complaints in the ED and prescribed gabapentin   Differential Diagnosis:   Hyperglycemia, diabetic neuropathy, DKA, HHS, foot infection  Clinical Tests:   Lab Tests: Ordered and Reviewed  ED Management:  37-year-old male with past medical history of diabetes presents with complaint of bilateral paresthesias for past month.  Accu-Chek in triage was elevated, however   patient denies any signs or symptoms to indicate presence of DKA.  He is comfortable, well-appearing, complaining only of his bilateral lower extremity discomfort.  No evidence of wounds or injury on exam.  Extremities are warm and well perfused with 2+ pulses.  Ambulates with a stable gait. Had extensive conversation with patient about the importance of taking his diabetes medications and establishing primary care because he may require insulin if metformin is not sufficient.  Provided patient with a printout of physicians that can help patients without insurance.  Discussed with patient the possibility of developing CKD requiring dialysis, visual disturbance, and other complications from his diabetes if it remains untreated. Counseled patient about appropriate diet to assist with glycemic control. All questions answered using . Referral also placed for [facility] in case he is able to obtain insurance, otherwise he will f/u with one of the clinics provided on printout.     No acute emergent medical condition has been identified. The patient appears to be low risk for an emergent medical condition is appropriate for discharge with outpatient f/u as detailed in discharge instructions for reevaluation and possible continued outpatient workup and management. I have discussed the workup with the patient, who has verbalized understanding of the plan and need for outpatient follow-up.  This evaluation does not preclude the development of an emergent condition so in addition, I have advised the patient that they can return to the ED at any time with worsening or change of their symptoms, or with any other medical complaint.                             Clinical Impression:   Final diagnoses:  [E11.42] Diabetic polyneuropathy associated with type 2 diabetes mellitus (Primary)  [E11.65] Uncontrolled type 2 diabetes mellitus with hyperglycemia        ED Disposition Condition    Discharge Stable          ED  Prescriptions       Medication Sig Dispense Start Date End Date Auth. Provider    metFORMIN (GLUCOPHAGE) 1000 MG tablet Take 1 tablet (1,000 mg total) by mouth 2 (two) times daily with meals. 60 tablet 8/13/2023 9/12/2023 Kamryn Valdez MD    gabapentin (NEURONTIN) 100 MG capsule Take 1 capsule (100 mg total) by mouth 3 (three) times daily. 90 capsule 8/13/2023 9/12/2023 Kamryn Valdez MD          Follow-up Information       Follow up With Specialties Details Why Contact Info Additional Information    [facility] Emergency Dept Emergency Medicine  As needed, If symptoms worsen [address] [address] [phone]     [facility] Family Medicine Schedule an appointment as soon as possible for a visit in 2 days  [address], [address]  [facility] [address] [phone] Please park in [address] C or D and use Mere pe[address]. Take Medical Office Bl. elevators.             Kamryn Valdez MD  08/13/23 7748

## 2024-08-21 NOTE — ANESTHESIA PREPROCEDURE EVALUATION
08/17/2020  Navi Álvarez is a 34 y.o., male Yi speaking, admitted s/p incarcerated inguinal hernia robotic repair under [facility] 2 weeks ago now scheduled for laparoscopic laparotomy for severe abdominal pain and vomiting with CT findings suggesting SBO.    Past Medical History:   Diagnosis Date    SBO (small bowel obstruction) 8/17/2020     Past Surgical History:   Procedure Laterality Date    LIPOMA RESECTION  8/3/2020    Procedure: EXCISION, LIPOMA;  Surgeon: Pancho Andrea MD;  Location: [facility] OR;  Service: General;;    ROBOT-ASSISTED LAPAROSCOPIC REPAIR OF INGUINAL HERNIA Bilateral 8/3/2020    Procedure: ROBOTIC REPAIR, HERNIA, INGUINAL;  Surgeon: Pancho Andrea MD;  Location: [facility] OR;  Service: General;  Laterality: Bilateral;         Anesthesia Evaluation    I have reviewed the Patient Summary Reports.   I have reviewed the NPO Status.   I have reviewed the Medications.     Review of Systems  Anesthesia Hx:  Denies Hx of Anesthetic complications  History of prior surgery of interest to airway management or planning:  Denies Personal Hx of Anesthesia complications.   Social:  Non-Smoker, Alcohol Use    Hematology/Oncology:  Hematology Normal   Oncology Normal     EENT/Dental:EENT/Dental Normal   Cardiovascular:  Cardiovascular Normal Exercise tolerance: good     Pulmonary:  Pulmonary Normal    Renal/:  Renal/ Normal     Hepatic/GI:  Hepatic/GI Normal    Musculoskeletal:  Musculoskeletal Normal    Neurological:  Neurology Normal    Endocrine:  Endocrine Normal    Dermatological:  Skin Normal    Psych:  Psychiatric Normal           Physical Exam  General:  Well nourished    Airway/Jaw/Neck:  Airway Findings: Mouth Opening: Normal Tongue: Normal  General Airway Assessment: Adult  Mallampati: II  TM Distance: Normal, at least 6 cm      Dental:  Dental Findings: Periodontal disease,  Severe    Chest/Lungs:  Chest/Lungs Clear    Heart/Vascular:  Heart Findings: Normal       Mental Status:  Mental Status Findings:  Cooperative, Alert and Oriented       Lab Results   Component Value Date    WBC 16.65 (H) 08/17/2020    HGB 16.9 08/17/2020    HCT 47.2 08/17/2020     (H) 08/17/2020    ALT 19 08/17/2020    AST 12 08/17/2020     08/17/2020    K 2.9 (L) 08/17/2020     08/17/2020    CREATININE 0.8 08/17/2020    BUN 19 08/17/2020    CO2 15 (L) 08/17/2020         Anesthesia Plan  Type of Anesthesia, risks & benefits discussed:  Anesthesia Type:  general  Patient's Preference:   Intra-op Monitoring Plan: standard ASA monitors  Intra-op Monitoring Plan Comments:   Post Op Pain Control Plan: multimodal analgesia  Post Op Pain Control Plan Comments:   Induction:   rapid sequence  Beta Blocker:  Patient is not currently on a Beta-Blocker (No further documentation required).       Informed Consent: Patient understands risks and agrees with Anesthesia plan.  Questions answered. Anesthesia consent signed with patient.  ASA Score: 3     Day of Surgery Review of History & Physical:    H&P update referred to the surgeon.     Anesthesia Plan Notes:  used.  number on consent.         Ready For Surgery From Anesthesia Perspective.        - pt with hx of hypothyroid  - continue synthroid  - TSH is elevated 6.3  - F/U FT3/FT4  - Endo Dr. Muse consulted

## (undated) DEVICE — NDL 22GA X1 1/2 REG BEVEL

## (undated) DEVICE — ELECTRODE REM PLYHSV RETURN 9

## (undated) DEVICE — SEE MEDLINE ITEM 156955

## (undated) DEVICE — DRAPE ARM DAVINCI XI

## (undated) DEVICE — DRESSING TEGADERM 2 3/8 X 2.75

## (undated) DEVICE — OBTURATOR BLADELESS 8MM XI CLR

## (undated) DEVICE — COVER TIP CURVED SCISSORS XI

## (undated) DEVICE — SUT VICRYL 3-0 27 SH

## (undated) DEVICE — GLOVE BIOGEL SKINSENSE PI 8.0

## (undated) DEVICE — SUT ETHILON 3/0 18IN PS-1

## (undated) DEVICE — DISSECTOR EPIX LAPA 5MMX35CM

## (undated) DEVICE — SEAL UNIVERSAL 5MM-8MM XI

## (undated) DEVICE — MANIFOLD 4 PORT

## (undated) DEVICE — DRESSING AQUACEL SACRAL 9 X 9

## (undated) DEVICE — TROCAR ENDOPATH XCEL 11MM 10CM

## (undated) DEVICE — TUBING INSUFFLATION 10

## (undated) DEVICE — SUT PROLENE 0 CT-2 BL MONO

## (undated) DEVICE — SUT MCRYL PLUS 4-0 PS2 27IN

## (undated) DEVICE — TROCAR ENDOPATH XCEL 5X75MM

## (undated) DEVICE — SUT MONOCRYL 5-0 P-3 UND 18

## (undated) DEVICE — COVER OVERHEAD SURG LT BLUE

## (undated) DEVICE — BLADE SURG #15 CARBON STEEL

## (undated) DEVICE — SPONGE DERMA 8PLY 2X2

## (undated) DEVICE — ADHESIVE DERMABOND ADVANCED

## (undated) DEVICE — KIT WING PAD POSITIONING

## (undated) DEVICE — DRESSING TRANS 4X4 3/4

## (undated) DEVICE — NDL INSUF ULTRA VERESS 120MM

## (undated) DEVICE — TROCAR ENDOPATH XCEL 5MM 7.5CM

## (undated) DEVICE — BAG TISSUE RETRIEVAL 225ML

## (undated) DEVICE — PACK BASIC

## (undated) DEVICE — SEE MEDLINE ITEM 146292

## (undated) DEVICE — SEE MEDLINE ITEM 152622

## (undated) DEVICE — SEE MEDLINE ITEM 156952

## (undated) DEVICE — SUT VLOC 90 3-0 V-20 NDL 6

## (undated) DEVICE — SEE MEDLINE ITEM 157117

## (undated) DEVICE — NDL HYPO REG 25G X 1 1/2

## (undated) DEVICE — BANDAGE ADH SOFT FLEX 1X3

## (undated) DEVICE — CLOSURE SKIN STERI STRIP 1/2X4

## (undated) DEVICE — SUT V-LOC 90 UD GS22 2-0 9IN

## (undated) DEVICE — CANNULA REDUCER 12-8MM

## (undated) DEVICE — IRRIGATOR ENDOSCOPY DISP.

## (undated) DEVICE — SEE MEDLINE ITEM 157116

## (undated) DEVICE — APPLICATOR CHLORAPREP ORN 26ML

## (undated) DEVICE — KIT ANTIFOG

## (undated) DEVICE — SEE MEDLINE ITEM 157148

## (undated) DEVICE — BLADE SURG CARBON STEEL SZ11

## (undated) DEVICE — TRAY FOLEY 16FR INFECTION CONT

## (undated) DEVICE — SCISSOR HOT SHEARS XI